# Patient Record
Sex: FEMALE | Race: OTHER | Employment: FULL TIME | URBAN - METROPOLITAN AREA
[De-identification: names, ages, dates, MRNs, and addresses within clinical notes are randomized per-mention and may not be internally consistent; named-entity substitution may affect disease eponyms.]

---

## 2017-01-06 ENCOUNTER — OFFICE VISIT (OUTPATIENT)
Dept: FAMILY MEDICINE CLINIC | Age: 37
End: 2017-01-06

## 2017-01-06 VITALS
HEART RATE: 78 BPM | SYSTOLIC BLOOD PRESSURE: 132 MMHG | TEMPERATURE: 98.1 F | BODY MASS INDEX: 21.67 KG/M2 | HEIGHT: 68 IN | DIASTOLIC BLOOD PRESSURE: 83 MMHG | WEIGHT: 143 LBS

## 2017-01-06 DIAGNOSIS — L42 PITYRIASIS ROSEA: Primary | ICD-10-CM

## 2017-01-06 RX ORDER — PREDNISONE 10 MG/1
TABLET ORAL
Qty: 21 TAB | Refills: 0 | Status: SHIPPED | OUTPATIENT
Start: 2017-01-06 | End: 2021-09-02

## 2017-01-06 RX ORDER — TRIAMCINOLONE ACETONIDE 1 MG/G
CREAM TOPICAL 2 TIMES DAILY
Qty: 60 G | Refills: 1 | Status: SHIPPED | OUTPATIENT
Start: 2017-01-06 | End: 2021-09-02

## 2017-01-06 RX ORDER — LORATADINE 10 MG/1
10 TABLET ORAL DAILY
Qty: 30 TAB | Refills: 2 | Status: SHIPPED | OUTPATIENT
Start: 2017-01-06 | End: 2021-09-02

## 2017-01-06 RX ORDER — DIPHENHYDRAMINE HCL 25 MG
50 CAPSULE ORAL
Qty: 30 CAP | Refills: 1 | Status: SHIPPED | OUTPATIENT
Start: 2017-01-06 | End: 2021-09-02

## 2017-01-06 NOTE — PROGRESS NOTES
Avs discussed with Chelsea 21 by Discharge Nurse Mary Longo LPN with  Jaguar Rosales. Discussed medication prescribed today, price info given to pt form good rx. Family planning info given to pt. Pt verbalized understanding and has no further questions.  AVS printed and given to patient Mary Longo LPN

## 2017-01-06 NOTE — PATIENT INSTRUCTIONS
Pitiriasis rosada: Instrucciones de cuidado - [ Pityriasis Rosea: Care Instructions ]  Instrucciones de cuidado  La pitiriasis rosada es un salpullido común de la piel. Suele empezar rick lissa nita escamosa de color entre rojizo y rosáceo en el abdomen o la espalda. Días o semanas después, aparecen más manchas. El salpullido puede causar comezón, malcolm no se transmitirá a otras personas. No se conocen las causas de la pitiriasis rosada. Algunos médicos creen que es lissa reacción a un virus. La pitiriasis rosada es más común en los niños y [place]. Dura de 1 a 3 meses y luego desaparece por sí ren. El uso de un medicamento puede ayudarle a aliviar la comezón. La atención de seguimiento es lissa parte clave de kate tratamiento y seguridad. Asegúrese de hacer y acudir a todas las citas, y llame a kate médico si está teniendo problemas. También es lissa buena idea saber los resultados de los exámenes y mantener lissa lista de los medicamentos que lalita. ¿Cómo puede cuidarse en el hogar? · Use halie medicamentos exactamente rick le fueron recetados. Llame a kate médico si tiene algún problema con los medicamentos. · Exponga la piel a pequeñas cantidades de tiffani solar, malcolm evite las quemaduras de sol. La tiffani solar puede reducir el salpullido. · Utilice un [facility], rick [place] o [place], cuando se lave la piel. · Añada un puñado de josé antonio (molida en forma de polvo) al agua del baño. O puede probar un producto para el baño a base de josé antonio, rick [place]. Mantenga el agua tibia o templada. Un baño o lissa ducha con [place] puede hacer que el salpullido sea más visible y que le cause más comezón. · Use lissa crema con hidrocortisona al 1% de venta dale para las pequeñas zonas que causan comezón. ¿Cuándo debe pedir ayuda? Llame a kate médico ahora mismo o busque atención médica inmediata si:  · Tiene comezón en todo el cuerpo, malcolm no hay salpullido ni otra causa evidente.   · La comezón es tan intensa que no puede win. · El tratamiento en el hogar no le [facility]. · Kate piel está muy agrietada por rascarse. · Tiene signos de infección, tales rick:  ¨ Dolor, calor o hinchazón cerca del salpullido. ¨ Vetas rojizas cerca del salpullido. ¨ Pus que supura del salpullido. Klaus Gavin. · Ve el salpullido en [address] plantas de los pies. Preste especial atención a los cambios en kate tori y asegúrese de comunicarse con kate médico si:  · No mejora rick se esperaba. ¿Dónde puede encontrar más información en inglés? Garrett Ly a http://armin-raisa.info/. Escriba S327 en la búsqueda para aprender más acerca de \"Pitiriasis rosada: Instrucciones de cuidado - [ Pityriasis Rosea: Care Instructions ]. \"  Revisado: 5 febrero, 2016  Versión del contenido: 11.1  © 1737-6859 Healthwise, Incorporated. Las instrucciones de cuidado fueron adaptadas bajo licencia por [employer] (which disclaims liability or warranty for this information). Si usted tiene [place] afección médica o sobre estas instrucciones, siempre pregunte a kate profesional de tori. Healthwise, Incorporated niega toda garantía o responsabilidad por kate uso de esta información.

## 2017-01-06 NOTE — MR AVS SNAPSHOT
Visit Information Dima Ayoub Personal Médico Departamento Teléfono del Dep. Número de visita 1/6/2017 10:00 AM [facility] Laqueta Holiday [phone] Follow-up Instructions Return if symptoms worsen or fail to improve. Upcoming Health Maintenance Date Due DTaP/Tdap/Td series (1 - Tdap) 12/5/2002 PAP AKA CERVICAL CYTOLOGY 12/5/2002 INFLUENZA AGE 9 TO ADULT 8/1/2016 Alergias  Review Complete El: 1/6/2017 Por: Sharrell Lombard A partir del:  1/6/2017 No Known Allergies Vacunas actuales Sherren Bertram No hay ninguna vacuna archivada. No revisadas esta visita You Were Diagnosed With   
  
 Marly Stammer Pityriasis rosea    -  Primary ICD-10-CM: L42 
ICD-9-CM: 696.3 Partes vitales PS Pulso Temperatura [place] ( percentil de crecimiento) Peso (percentil de crecimiento) LMP (última lucy) 132/83 (BP 1 Location: Left arm, BP Patient Position: Sitting) 78 98.1 °F (36.7 °C) (Oral) 5' 7.68\" (1.719 m) 143 lb (64.9 kg) 12/15/2016 BMI [facility]) Estado obstétrico Estatus de tabaquísmo 21.95 kg/m2 Having regular periods Never Smoker Historial de signos vitales BMI and BSA Data Body Mass Index Body Surface Area  
 21.95 kg/m 2 1.76 m 2 [facility]acy Name Phone [facility] [phone] [facility] [phone] Weinstein lista de medicamentos actualizada Lista actualizada el: 1/6/17 11:08 AM.  Jose Juan Leonard use weinstein lista de medicamentos más reciente. diphenhydrAMINE 25 mg capsule También conocido rick:  BENADRYL Take 2 Caps by mouth every six (6) hours as needed. [place] 2 pastilla cada noche si necissita por weinstein piel  
  
 loratadine 10 mg tablet También conocido rick:  Philipp Ureña Take 1 Tab by mouth daily.  Lund 1 cada manana  
  
 triamcinolone acetonide 0.1 % topical cream  
 También conocido rick:  KENALOG Apply  to affected area two (2) times a day. use thin layer Recetas Enviado a la [place] Refills  
 triamcinolone acetonide (KENALOG) 0.1 % topical cream 1 Sig: Apply  to affected area two (2) times a day. use thin layer Class: Normal  
 Pharmacy: [address] Ph #: [phone] Route: Topical  
 loratadine (CLARITIN) 10 mg tablet 2 Sig: Take 1 Tab by mouth daily. [place] 1 cada [employer] Class: Normal  
 Pharmacy: [address] Ph #: [phone] Route: Oral  
 diphenhydrAMINE (BENADRYL) 25 mg capsule 1 Sig: Take 2 Caps by mouth every six (6) hours as needed. [place] 2 pastilla cada noche si necissita por kate piel Class: Normal  
 Pharmacy: [address] Ph #: [phone] Route: Oral  
  
Instrucciones de seguimiento Return if symptoms worsen or fail to improve. Instrucciones para el Paciente Pitiriasis rosada: Instrucciones de cuidado - [ Pityriasis Rosea: Care Instructions ] Instrucciones de cuidado La pitiriasis rosada es un salpullido común de la piel. Suele empezar rick lissa nita escamosa de color entre rojizo y rosáceo en el abdomen o la espalda. Días o semanas después, aparecen más manchas. El salpullido puede causar comezón, malcolm no se transmitirá a otras personas. No se conocen las causas de la pitiriasis rosada. Algunos médicos creen que es lissa reacción a un virus. La pitiriasis rosada es más común en los niños y [place]. Dura de 1 a 3 meses y luego desaparece por sí ren. El uso de un medicamento puede ayudarle a aliviar la comezón. La atención de seguimiento es lissa parte clave de kate tratamiento y seguridad. Asegúrese de hacer y acudir a todas las citas, y llame a kate médico si está teniendo problemas.  También es lissa buena idea [employer] resultados de los exámenes y mantener lissa lista de los medicamentos que lalita. Cómo puede cuidarse en el [facility]? · Use halie medicamentos exactamente rick le fueron recetados. Llame a kate médico si tiene algún problema con los medicamentos. · Exponga la piel a pequeñas cantidades de tiffani solar, malcolm evite las quemaduras de sol. La tiffani solar puede reducir el salpullido. · Utilice un [facility], rick [place] o [place], cuando se lave la piel. · Añada un puñado de josé antonio (molida en forma de polvo) al agua del baño. O puede probar un producto para el baño a base de josé antonio, rick [place]. Mantenga el agua tibia o templada. Un baño o lissa ducha con [place] puede hacer que el salpullido sea más visible y que le cause más comezón. · Use lissa crema con hidrocortisona al 1% de venta dale para las pequeñas zonas que causan comezón. Cuándo debe pedir ayuda? Llame a kate médico ahora mismo o busque atención médica inmediata si: · Tiene comezón en todo el cuerpo, malcolm no hay salpullido ni otra causa evidente. · La comezón es tan intensa que no puede dormir. · El tratamiento en el hogar no le [facility]. · Kate piel está muy agrietada por rascarse. · Tiene signos de infección, tales rick: ¨ Dolor, calor o hinchazón cerca del salpullido. ¨ Vetas rojizas cerca del salpullido. ¨ Pus que supura del salpullido. Argie Cleaves. · Ve el salpullido en [address] plantas de los pies. Preste especial atención a los cambios en kate tori y asegúrese de comunicarse con kate médico si: 
· No mejora rick se esperaba. Dónde puede encontrar más información en inglés? Gisela Glee a http://armin-raisa.info/. Escriba S327 en la búsqueda para aprender más acerca de \"Pitiriasis rosada: Instrucciones de cuidado - [ Pityriasis Rosea: Care Instructions ]. \" 
Revisado: 5 febrero, 2016 Versión del contenido: 11.1 © 4885-3921 [employer], Incorporated.  Las instrucciones de cuidado fueron adaptadas bajo licencia por [employer] (which disclaims liability or warranty for this information). Si usted tiene [place] afección médica o sobre estas instrucciones, siempre pregunte a kate profesional de tori. [facility]orporated niega toda garantía o responsabilidad por kate uso de esta información. Introducing [facility]! [facility] introduce portal paciente MyChart . Ahora se puede acceder a partes de kate expediente médico, enviar por correo electrónico la oficina de kate médico y solicitar renovaciones de medicamentos en línea. En kate navegador de Internet , Ras Colon a [email or web address] Sadiq clic en el usuario por Arturo Beat? Vonne Alexandro clic aquí en la sesión [place]. Verá la página de registro [place]. Ingrese kate código de [employer] sera y rick aparece a continuación. Usted no tendrá que [employer] código después de michael completado el proceso de registro . Si usted no se inscribe antes de la fecha de caducidad , debe solicitar un nuevo código. · MyChart Código de acceso : [id number] Expires: 4/6/2017 11:08 AM 
 
Ingresa los últimos cuatro dígitos de kate Número de Seguro Social ( xxxx ) y fecha de nacimiento ( dd / mm / aaaa ) rick se indica y sadiq clic en Enviar. ted será llevado a la siguiente página de registro . Crear un ID MyChart . Esta será kate ID de inicio de sesión de MyChart y no puede ser [place] , por lo que pensar en lissa que es Harry Amadou y fácil de recordar . Crear lissa contraseña MyChart . ted puede cambiar kate contraseña en cualquier momento . Ingrese kate Password Reset de preguntas y [place] . [place] se puede utilizar en un momento posterior si usted olvida kate contraseña. Introduzca kate dirección de correo electrónico . Dajuan Quesada recibirá lissa notificación por correo electrónico cuando la nueva información está disponible en MyChart . Darci Pillar clic en Registrarse.  Di Mings nate y descargar porciones de kate expediente Elena Heal clic en el enlace de descarga del menú Resumen para descargar lissa copia portátil de kate información médica . Si tiene [employer] , por favor visite la sección de preguntas frecuentes del sitio web MyChart . Recuerde, MyChart NO es que se utilizará para las necesidades urgentes. Para emergencias médicas , llame al 911 . Ahora disponible en kate iPhone y Android ! Por favor proporcione apolinar resumen de la documentación de cuidado a kate próximo proveedor. If you have any questions after today's visit, please call [phone].

## 2017-01-06 NOTE — PROGRESS NOTES
Assessment/Plan:    Randolph was seen today for rash. Diagnoses and all orders for this visit:    Pityriasis rosea  -     triamcinolone acetonide (KENALOG) 0.1 % topical cream; Apply  to affected area two (2) times a day. use thin layer  -     loratadine (CLARITIN) 10 mg tablet; Take 1 Tab by mouth daily. [place] 1 cada manana  -     diphenhydrAMINE (BENADRYL) 25 mg capsule; Take 2 Caps by mouth every six (6) hours as needed. [place] 2 pastilla cada noche si necissita por kate piel        Follow-up Disposition:  Return if symptoms worsen or fail to improve. Derrick Bingham PA-C  Randolph Jamal Crews expressed understanding of this plan. An AVS was printed and given to the patient.      ----------------------------------------------------------------------    Chief Complaint   Patient presents with    Rash     pt c/o itchy rash all over body x1 week       History of Present Illness:  Rash first noticed one lesion under her breast > 1 week ago then broke out in the current rash about 1 week ago. The rash is very pruritic in nature. She has not been sick recently. She has no sick family members. She has been taking OCP for a long time, no other meds and no new meds. She has not had Upper respiratory sxs nor a fever. She is eating well. She is uncomfortable due to the itchy nature of this rash. No past medical history on file. Current Outpatient Prescriptions   Medication Sig Dispense Refill    triamcinolone acetonide (KENALOG) 0.1 % topical cream Apply  to affected area two (2) times a day. use thin layer 60 g 1    loratadine (CLARITIN) 10 mg tablet Take 1 Tab by mouth daily. [place] 1 cada manana 30 Tab 2    diphenhydrAMINE (BENADRYL) 25 mg capsule Take 2 Caps by mouth every six (6) hours as needed.  [place] 2 pastilla cada noche si necissita por kate piel 30 Cap 1       No Known Allergies    Social History   Substance Use Topics    Smoking status: Never Smoker    Smokeless tobacco: None    Alcohol use No       No family history on file. Physical Exam:     Visit Vitals    /83 (BP 1 Location: Left arm, BP Patient Position: Sitting)    Pulse 78    Temp 98.1 °F (36.7 °C) (Oral)    Ht 5' 7.68\" (1.719 m)    Wt 143 lb (64.9 kg)    LMP 12/15/2016    BMI 21.95 kg/m2       A&Ox3  WDWN NAD  Respirations normal and non labored  Pt has one large lesion under her left breast that she states was the first lesion she noticed- it is about 2 times the size of the other lesions. The lesions are loosely arranged in the typical Trina tree pattern. She has lesions on her arms, chest, abdomen and back. They spare her face and hands. The lesions are characterized by being pinkish, salmon colored small plaques with oval configuration. They are scaly in appearance with a collarette scale.  Most of the lesions are between dime and nickel sized except for the herald patch that is twice this size

## 2017-05-20 ENCOUNTER — HOSPITAL ENCOUNTER (OUTPATIENT)
Dept: LAB | Age: 37
Discharge: HOME | End: 2017-05-20

## 2017-05-20 LAB
ALBUMIN SERPL BCP-MCNC: 3.9 G/DL (ref 3.5–5)
ALBUMIN/GLOB SERPL: 1.1 {RATIO} (ref 1.1–2.2)
ALP SERPL-CCNC: 137 U/L (ref 45–117)
ALT SERPL-CCNC: 27 U/L (ref 12–78)
ANION GAP BLD CALC-SCNC: 8 MMOL/L (ref 5–15)
AST SERPL W P-5'-P-CCNC: 19 U/L (ref 15–37)
BILIRUB SERPL-MCNC: 0.4 MG/DL (ref 0.2–1)
BUN SERPL-MCNC: 10 MG/DL (ref 6–20)
BUN/CREAT SERPL: 18 (ref 12–20)
CALCIUM SERPL-MCNC: 8.9 MG/DL (ref 8.5–10.1)
CHLORIDE SERPL-SCNC: 105 MMOL/L (ref 97–108)
CHOLEST SERPL-MCNC: 163 MG/DL
CO2 SERPL-SCNC: 28 MMOL/L (ref 21–32)
CREAT SERPL-MCNC: 0.56 MG/DL (ref 0.55–1.02)
GLOBULIN SER CALC-MCNC: 3.5 G/DL (ref 2–4)
GLUCOSE SERPL-MCNC: 84 MG/DL (ref 65–100)
HDLC SERPL-MCNC: 75 MG/DL
HDLC SERPL: 2.2 {RATIO} (ref 0–5)
LDLC SERPL CALC-MCNC: 81.4 MG/DL (ref 0–100)
LIPID PROFILE,FLP: NORMAL
POTASSIUM SERPL-SCNC: 4.2 MMOL/L (ref 3.5–5.1)
PROT SERPL-MCNC: 7.4 G/DL (ref 6.4–8.2)
SODIUM SERPL-SCNC: 141 MMOL/L (ref 136–145)
TRIGL SERPL-MCNC: 33 MG/DL (ref ?–150)
VLDLC SERPL CALC-MCNC: 6.6 MG/DL

## 2017-05-20 PROCEDURE — 80061 LIPID PANEL: CPT | Performed by: NURSE PRACTITIONER

## 2017-05-20 PROCEDURE — 80053 COMPREHEN METABOLIC PANEL: CPT | Performed by: NURSE PRACTITIONER

## 2017-07-13 ENCOUNTER — HOSPITAL ENCOUNTER (OUTPATIENT)
Dept: LAB | Age: 37
Discharge: HOME | End: 2017-07-13

## 2017-07-13 PROCEDURE — 88175 CYTOPATH C/V AUTO FLUID REDO: CPT | Performed by: NURSE PRACTITIONER

## 2017-07-13 PROCEDURE — 87624 HPV HI-RISK TYP POOLED RSLT: CPT | Performed by: NURSE PRACTITIONER

## 2017-12-20 ENCOUNTER — HOSPITAL ENCOUNTER (OUTPATIENT)
Dept: MAMMOGRAPHY | Age: 37
Discharge: HOME | End: 2017-12-20
Attending: INTERNAL MEDICINE
Payer: SELF-PAY

## 2017-12-20 DIAGNOSIS — N64.9 BREAST DISORDER: ICD-10-CM

## 2017-12-20 PROCEDURE — 76882 US LMTD JT/FCL EVL NVASC XTR: CPT

## 2017-12-20 PROCEDURE — 77066 DX MAMMO INCL CAD BI: CPT

## 2020-10-08 ENCOUNTER — HOSPITAL ENCOUNTER (OUTPATIENT)
Dept: LAB | Age: 40
Discharge: HOME | End: 2020-10-08

## 2020-10-08 LAB
ALBUMIN SERPL-MCNC: 3.7 G/DL (ref 3.5–5)
ALBUMIN/GLOB SERPL: 0.9 {RATIO} (ref 1.1–2.2)
ALP SERPL-CCNC: 116 U/L (ref 45–117)
ALT SERPL-CCNC: 17 U/L (ref 12–78)
ANION GAP SERPL CALC-SCNC: 5 MMOL/L (ref 5–15)
AST SERPL-CCNC: 19 U/L (ref 15–37)
BASOPHILS # BLD: 0.1 K/UL (ref 0–0.1)
BASOPHILS NFR BLD: 2 % (ref 0–1)
BILIRUB SERPL-MCNC: 0.4 MG/DL (ref 0.2–1)
BUN SERPL-MCNC: 10 MG/DL (ref 6–20)
BUN/CREAT SERPL: 17 (ref 12–20)
CALCIUM SERPL-MCNC: 9.3 MG/DL (ref 8.5–10.1)
CHLORIDE SERPL-SCNC: 106 MMOL/L (ref 97–108)
CHOLEST SERPL-MCNC: 181 MG/DL
CO2 SERPL-SCNC: 27 MMOL/L (ref 21–32)
CREAT SERPL-MCNC: 0.6 MG/DL (ref 0.55–1.02)
DIFFERENTIAL METHOD BLD: ABNORMAL
EOSINOPHIL # BLD: 0.1 K/UL (ref 0–0.4)
EOSINOPHIL NFR BLD: 1 % (ref 0–7)
ERYTHROCYTE [DISTWIDTH] IN BLOOD BY AUTOMATED COUNT: 16.9 % (ref 11.5–14.5)
GLOBULIN SER CALC-MCNC: 3.9 G/DL (ref 2–4)
GLUCOSE SERPL-MCNC: 78 MG/DL (ref 65–100)
HCT VFR BLD AUTO: 38.1 % (ref 35–47)
HDLC SERPL-MCNC: 68 MG/DL
HDLC SERPL: 2.7 {RATIO} (ref 0–5)
HGB BLD-MCNC: 11.1 G/DL (ref 11.5–16)
IMM GRANULOCYTES # BLD AUTO: 0 K/UL (ref 0–0.04)
IMM GRANULOCYTES NFR BLD AUTO: 0 % (ref 0–0.5)
LDLC SERPL CALC-MCNC: 100.6 MG/DL (ref 0–100)
LIPID PROFILE,FLP: ABNORMAL
LYMPHOCYTES # BLD: 1.3 K/UL (ref 0.8–3.5)
LYMPHOCYTES NFR BLD: 21 % (ref 12–49)
MCH RBC QN AUTO: 22.2 PG (ref 26–34)
MCHC RBC AUTO-ENTMCNC: 29.1 G/DL (ref 30–36.5)
MCV RBC AUTO: 76.2 FL (ref 80–99)
MONOCYTES # BLD: 0.4 K/UL (ref 0–1)
MONOCYTES NFR BLD: 6 % (ref 5–13)
NEUTS SEG # BLD: 4.3 K/UL (ref 1.8–8)
NEUTS SEG NFR BLD: 70 % (ref 32–75)
NRBC # BLD: 0 K/UL (ref 0–0.01)
NRBC BLD-RTO: 0 PER 100 WBC
PLATELET # BLD AUTO: 309 K/UL (ref 150–400)
PMV BLD AUTO: 12.3 FL (ref 8.9–12.9)
POTASSIUM SERPL-SCNC: 4.4 MMOL/L (ref 3.5–5.1)
PROT SERPL-MCNC: 7.6 G/DL (ref 6.4–8.2)
RBC # BLD AUTO: 5 M/UL (ref 3.8–5.2)
SODIUM SERPL-SCNC: 138 MMOL/L (ref 136–145)
TRIGL SERPL-MCNC: 62 MG/DL (ref ?–150)
VLDLC SERPL CALC-MCNC: 12.4 MG/DL
WBC # BLD AUTO: 6.1 K/UL (ref 3.6–11)

## 2020-10-08 PROCEDURE — 80053 COMPREHEN METABOLIC PANEL: CPT

## 2020-10-08 PROCEDURE — 85025 COMPLETE CBC W/AUTO DIFF WBC: CPT

## 2020-10-08 PROCEDURE — 80061 LIPID PANEL: CPT

## 2021-04-28 ENCOUNTER — TRANSCRIBE ORDER (OUTPATIENT)
Dept: SCHEDULING | Age: 41
End: 2021-04-28

## 2021-04-28 DIAGNOSIS — R19.03 ABDOMINAL SWELLING, RIGHT LOWER QUADRANT: Primary | ICD-10-CM

## 2021-05-04 ENCOUNTER — HOSPITAL ENCOUNTER (OUTPATIENT)
Dept: ULTRASOUND IMAGING | Age: 41
Discharge: HOME | End: 2021-05-04
Attending: INTERNAL MEDICINE
Payer: SUBSIDIZED

## 2021-05-04 DIAGNOSIS — R19.03 ABDOMINAL SWELLING, RIGHT LOWER QUADRANT: ICD-10-CM

## 2021-05-04 PROCEDURE — 76856 US EXAM PELVIC COMPLETE: CPT

## 2021-05-04 PROCEDURE — 76830 TRANSVAGINAL US NON-OB: CPT

## 2021-05-26 ENCOUNTER — OFFICE VISIT (OUTPATIENT)
Dept: GYNECOLOGY | Age: 41
End: 2021-05-26

## 2021-05-26 VITALS
DIASTOLIC BLOOD PRESSURE: 75 MMHG | HEIGHT: 67 IN | HEART RATE: 72 BPM | BODY MASS INDEX: 23.07 KG/M2 | SYSTOLIC BLOOD PRESSURE: 117 MMHG | WEIGHT: 147 LBS

## 2021-05-26 DIAGNOSIS — N85.8 UTERINE MASS: Primary | ICD-10-CM

## 2021-05-26 PROCEDURE — 99204 OFFICE O/P NEW MOD 45 MIN: CPT | Performed by: OBSTETRICS & GYNECOLOGY

## 2021-05-26 RX ORDER — NORGESTIMATE AND ETHINYL ESTRADIOL 0.25-0.035
KIT ORAL
COMMUNITY
Start: 2021-05-18

## 2021-05-26 NOTE — PROGRESS NOTES
New Patient, Referred by Dr. Kathleen Sanchez now for uterine mass    1. Have you been to the ER, urgent care clinic since your last visit? Hospitalized since your last visit?  no    2. Have you seen or consulted any other health care providers outside of the [address] [place] [address] since your last visit? Include any pap smears or colon screening.    Yes

## 2021-06-01 ENCOUNTER — HOSPITAL ENCOUNTER (OUTPATIENT)
Dept: MRI IMAGING | Age: 41
Discharge: HOME | End: 2021-06-01
Attending: OBSTETRICS & GYNECOLOGY
Payer: SUBSIDIZED

## 2021-06-01 VITALS — BODY MASS INDEX: 22.87 KG/M2 | WEIGHT: 146 LBS

## 2021-06-01 DIAGNOSIS — N85.8 UTERINE MASS: ICD-10-CM

## 2021-06-01 PROCEDURE — 74011250636 HC RX REV CODE- 250/636: Performed by: OBSTETRICS & GYNECOLOGY

## 2021-06-01 PROCEDURE — 72197 MRI PELVIS W/O & W/DYE: CPT

## 2021-06-01 PROCEDURE — A9575 INJ GADOTERATE MEGLUMI 0.1ML: HCPCS | Performed by: OBSTETRICS & GYNECOLOGY

## 2021-06-01 RX ORDER — GADOTERATE MEGLUMINE 376.9 MG/ML
13 INJECTION INTRAVENOUS
Status: COMPLETED | OUTPATIENT
Start: 2021-06-01 | End: 2021-06-01

## 2021-06-01 RX ADMIN — GADOTERATE MEGLUMINE 13 ML: 376.9 INJECTION INTRAVENOUS at 15:03

## 2021-06-02 PROBLEM — N85.8 UTERINE MASS: Status: ACTIVE | Noted: 2021-06-02

## 2021-06-02 NOTE — PROGRESS NOTES
[facility] [address], [address]  P [phone]  F [phone]    Office Note  Patient ID:  Name:  Es Lamar  MRN:  [id number]  :  1980/40 y.o. Date:  2021      HISTORY OF PRESENT ILLNESS:  Ms. Es Lamar is a 45 y.o.  premenopausal female who presents as a new patient from [facility] for concerns of a possible uterine mass. Patient reports RLQ severe stabbing pain in 2021. Denies nausea or vomiting. Denies change in appetite or bowel habits. Denies change in menses. Denies fevers or chills. Denies constipation or diarrhea. Reports that the pain has completely resolved now. She reports the pain started when she came off of her OCPs. Now that she has restarted them her pain has resolved. Denies CP or SOB. Negative UPT at that time. Pelvic ultrasound on 2021 demonstrated \"uterien mass, with carcinoma not excluded\". She was then subsequently referred to our office for further discussion and management. Pertinent PMH/PSH: n/a      Active, no restrictions. Imaging Review:   Pelvic ultrasound 2021:  \"A midline smooth oval solid uterine mass with internal vascularity measures 2.6 x 2.5 x 2.0cm and fills and distends the fundal portion of th endometrial cavity. Differential considerations include endometrial carcinoma, endometrial polyp, submucosal fibroid. \"    ROS:  A comprehensive review of systems was negative except for that written in the History of Present Illness. , 10 point ROS    OB/GYN ROS:  Patient denies significant menstrual problems.     ECOG stGstrstastdstest:st st1st Problem List:  Patient Active Problem List    Diagnosis Date Noted    Uterine mass 2021     PMH:  Past Medical History:   Diagnosis Date    Abnormal Papanicolaou smear of cervix 2018    ASCUS    Rosacea       PSH:  Past Surgical History:   Procedure Laterality Date    HX COLPOSCOPY  2020    HX LEEP PROCEDURE JONH 3      Social History:  Social History     Tobacco Use    Smoking status: Never Smoker    Smokeless tobacco: Never Used   Substance Use Topics    Alcohol use: No      Family History:  Family History   Problem Relation Age of Onset    Hypertension Mother     Diabetes Maternal Grandmother     Cancer Maternal Grandfather         colon cancer      Medications: (reviewed)  Current Outpatient Medications   Medication Sig    Sprintec, 28, 0.25-35 mg-mcg tab TAKE 1 TABLET BY MOUTH ONCE DAILY    loratadine (CLARITIN) 10 mg tablet Take 1 Tab by mouth daily. Rodriguez Hevia 1 cada debby    triamcinolone acetonide (KENALOG) 0.1 % topical cream Apply  to affected area two (2) times a day. use thin layer (Patient not taking: Reported on 5/26/2021)    diphenhydrAMINE (BENADRYL) 25 mg capsule Take 2 Caps by mouth every six (6) hours as needed. Rodriguez Hevia 2 pastilla cada noche si necissita por kate piel (Patient not taking: Reported on 5/26/2021)    predniSONE (DELTASONE) 10 mg tablet Take 6 today then 1 less pill daily until all pills are gone (Patient not taking: Reported on 5/26/2021)     No current facility-administered medications for this visit. Allergies: (reviewed)  No Known Allergies     Gyn History:   Last pap: ASC-H  2017 being followed up by Dr. Nia Rose per patient  History of abnormal pap: yes, h/o JONH-3 s/p prior LEEP  Menses: regular  Contraception: OCPs     OBJECTIVE:    Physical Exam:  VITAL SIGNS: Vitals:    05/26/21 1439   BP: 117/75   Pulse: 72   Weight: 147 lb (66.7 kg)   Height: 5' 7\" (1.702 m)     Body mass index is 23.02 kg/m². GENERAL NIRAJ: Conversant, alert, oriented. No acute distress. HEENT: HEENT. No thyroid enlargement. No JVD. Neck: Supple without restrictions. RESPIRATORY: Clear to auscultation and percussion to the bases. No CVAT. CARDIOVASC: RRR without murmur/rub.    GASTROINT: soft, non-tender, without masses or organomegaly   MUSCULOSKEL: no joint tenderness, deformity or swelling       EXTREMITIES: extremities normal, atraumatic, no cyanosis or edema   PELVIC: Exam chaperoned by nurse. Normal appearing external genitalia. On speculum exam, normal appearing vagina and cervix. On bimanual exam, the cervix and uterus are normal size and mobile. There is a small 2-3cm anterior fibroid. No evidence of adnexal masses or nodularity. RECTAL: deferred   DEVI SURVEY: No suspicious lymphadenopathy or edema noted. NEURO: Grossly intact. No acute deficit. Lab Date as available:    Lab Results   Component Value Date/Time    WBC 6.1 10/08/2020 09:09 AM    HGB 11.1 (L) 10/08/2020 09:09 AM    HCT 38.1 10/08/2020 09:09 AM    PLATELET 535 54/53/2021 09:09 AM    MCV 76.2 (L) 10/08/2020 09:09 AM     Lab Results   Component Value Date/Time    Sodium 138 10/08/2020 09:09 AM    Potassium 4.4 10/08/2020 09:09 AM    Chloride 106 10/08/2020 09:09 AM    CO2 27 10/08/2020 09:09 AM    Anion gap 5 10/08/2020 09:09 AM    Glucose 78 10/08/2020 09:09 AM    BUN 10 10/08/2020 09:09 AM    Creatinine 0.60 10/08/2020 09:09 AM    BUN/Creatinine ratio 17 10/08/2020 09:09 AM    GFR est AA >60 10/08/2020 09:09 AM    GFR est non-AA >60 10/08/2020 09:09 AM    Calcium 9.3 10/08/2020 09:09 AM         IMPRESSION/PLAN:    Ms. Tri Soria is a 45 y.o. female with a working diagnosis of possible uterine mass    Problems:     Patient Active Problem List    Diagnosis Date Noted    Uterine mass 06/02/2021       I reviewed Ms. Randolph Crews's course to date, including her medical records, recent studies, physical exam, and review of symptoms. Fortunately the patient's symptomatology has resolved. Her exam is normal. I have recommended a pelvic MRI for further evaluation of her uterine mass as this most likely is a fibroid. Reassured patient. Having said that, I do believe an MRI is warranted to rule out any other malignant process.  The patient will return via virtual visit to discuss the results of the MRI and any possible further imaging. Per the patient she is following up with Dr. Addie Fox regarding her ASC-H pap smear in 2017. I will confirm this again with the patient when she returns, as this certainly needs attention if she is not following up. Today's visit performed with an .      Defined Sensitive Document    >50% of total time allocated to visit dedicated to counseling, 50 minutes total.    Signed By: Jasen Perez MD     6/2/2021/9:09 AM

## 2021-06-16 ENCOUNTER — OFFICE VISIT (OUTPATIENT)
Dept: GYNECOLOGY | Age: 41
End: 2021-06-16
Payer: SUBSIDIZED

## 2021-06-16 VITALS
HEART RATE: 76 BPM | DIASTOLIC BLOOD PRESSURE: 80 MMHG | BODY MASS INDEX: 22.91 KG/M2 | WEIGHT: 146 LBS | SYSTOLIC BLOOD PRESSURE: 123 MMHG | HEIGHT: 67 IN

## 2021-06-16 DIAGNOSIS — N85.8 UTERINE MASS: Primary | ICD-10-CM

## 2021-06-16 DIAGNOSIS — N75.0 BARTHOLIN'S CYST: ICD-10-CM

## 2021-06-16 PROCEDURE — 99213 OFFICE O/P EST LOW 20 MIN: CPT | Performed by: OBSTETRICS & GYNECOLOGY

## 2021-06-16 NOTE — PROGRESS NOTES
MRI results    1. Have you been to the ER, urgent care clinic since your last visit? Hospitalized since your last visit?  no    2. Have you seen or consulted any other health care providers outside of the [address] [place] [address] since your last visit? Include any pap smears or colon screening.    no

## 2021-07-12 NOTE — PROGRESS NOTES
One month follow up for bartholin's cyst ,  pt reports no abnormal spotting or bleeding, pt states she has no questions or concerns for today's visit    1. Have you been to the ER, urgent care clinic since your last visit? Hospitalized since your last visit?  no    2. Have you seen or consulted any other health care providers outside of the [address] [place] [address] since your last visit? Include any pap smears or colon screening.    no

## 2021-07-13 ENCOUNTER — OFFICE VISIT (OUTPATIENT)
Dept: GYNECOLOGY | Age: 41
End: 2021-07-13
Payer: SUBSIDIZED

## 2021-07-13 VITALS
HEART RATE: 76 BPM | DIASTOLIC BLOOD PRESSURE: 85 MMHG | SYSTOLIC BLOOD PRESSURE: 125 MMHG | BODY MASS INDEX: 23.48 KG/M2 | HEIGHT: 67 IN | WEIGHT: 149.6 LBS

## 2021-07-13 DIAGNOSIS — N75.0 BARTHOLIN'S CYST: Primary | ICD-10-CM

## 2021-07-13 DIAGNOSIS — N85.8 UTERINE MASS: ICD-10-CM

## 2021-07-13 PROCEDURE — 99214 OFFICE O/P EST MOD 30 MIN: CPT | Performed by: OBSTETRICS & GYNECOLOGY

## 2021-07-13 NOTE — PROGRESS NOTES
[address] [facility] [address], [address] [facility], [place], operated by [facility] [phone]  F [phone]    Office Note  Patient ID:  Name:  Dereje Clifton  MRN:  [id number]  :  1980/40 y.o. Date:  2021      HISTORY OF PRESENT ILLNESS:  Ms. Dereje Clifton is a 45 y.o. female who initially presented from [facility] for concerns of a possible uterine mass. MRI pelvis 2021:  \"1. Normal sized uterus contains multiple fibroids. Largest fibroid measures 2.6  cm and is submucosal in the fundus. This finding corresponds to the ultrasound  finding. No imaging evidence of endometrial carcinoma. If the patient has  abnormal uterine bleeding, consider biopsy to prove fibroid rather than  carcinoma. 2. 5.7 cm Bartholin's gland cyst to the right of midline may be palpable in the  labia majora. 1.1 cm left Bartholin's gland cyst.  3. Normal ovaries. \"    Presents back today to view Bartholin's cyst, to see if it is still present. She denies any symptoms. Initial History:  Ms. Dereje Clifton is a 45 y.o.  premenopausal female who presents as a new patient from [facility] for concerns of a possible uterine mass. Patient reports RLQ severe stabbing pain in 2021. Denies nausea or vomiting. Denies change in appetite or bowel habits. Denies change in menses. Denies fevers or chills. Denies constipation or diarrhea. Reports that the pain has completely resolved now. She reports the pain started when she came off of her OCPs. Now that she has restarted them her pain has resolved. Denies CP or SOB. Negative UPT at that time. Pelvic ultrasound on 2021 demonstrated \"uterien mass, with carcinoma not excluded\". She was then subsequently referred to our office for further discussion and management. Pertinent PMH/PSH: n/a      Active, no restrictions. Imaging Review:   MRI pelvis 2021:  FINDINGS: The uterus measures 9.3 x 5.6 x 4.1 cm. Hypointense T2 enhancing  submucosal fibroid at the fundus measures 2.6 x 2.6 x 2.4 cm. Intramural  posterior fundal fibroid measures 1.7 cm. Other fibroids are smaller. The endometrial stripe measures 1.7 cm. The junctional zone measures 0.5 cm. The  cervix is within normal limits. Hyperintense T2 nonenhancing cyst at the base of the distal vagina to the right  of midline extends into the right labia majora and measures 5.7 x 2.9 x 2.3 cm. A cyst at the left base of the vagina measures 1.1 cm in craniocaudal dimension. Both of these cysts are inferior to the pubic symphysis. No solidly enhancing  vaginal mass. The right ovary measures 2.7 x 2.3 x 2.2 cm. The left ovary measures 3.4 x 1.5 x  2.5 cm. No solid adnexal mass. Rectum and anus are within normal limits. Urinary bladder is within normal  limits. There is no free fluid. Bones are within normal limits. IMPRESSION  1. Normal sized uterus contains multiple fibroids. Largest fibroid measures 2.6  cm and is submucosal in the fundus. This finding corresponds to the ultrasound  finding. No imaging evidence of endometrial carcinoma. If the patient has  abnormal uterine bleeding, consider biopsy to prove fibroid rather than  carcinoma. 2. 5.7 cm Bartholin's gland cyst to the right of midline may be palpable in the  labia majora. 1.1 cm left Bartholin's gland cyst.  3. Normal ovaries. Pelvic ultrasound 5/4/2021:  \"A midline smooth oval solid uterine mass with internal vascularity measures 2.6 x 2.5 x 2.0cm and fills and distends the fundal portion of th endometrial cavity. Differential considerations include endometrial carcinoma, endometrial polyp, submucosal fibroid. \"    ROS:  A comprehensive review of systems was negative except for that written in the History of Present Illness. , 10 point ROS    OB/GYN ROS:  Patient denies significant menstrual problems.     ECOG stGstrstastdstest:st st1st Problem List:  Patient Active Problem List    Diagnosis Date Noted    Bartholin's cyst 06/16/2021    Uterine mass 06/02/2021     PMH:  Past Medical History:   Diagnosis Date    Abnormal Papanicolaou smear of cervix 04/2018    ASCUS    Rosacea       PSH:  Past Surgical History:   Procedure Laterality Date    HX COLPOSCOPY  06/2020    HX LEEP PROCEDURE      JONH 3      Social History:  Social History     Tobacco Use    Smoking status: Never Smoker    Smokeless tobacco: Never Used   Substance Use Topics    Alcohol use: No      Family History:  Family History   Problem Relation Age of Onset    Hypertension Mother     Diabetes Maternal Grandmother     Cancer Maternal Grandfather         colon cancer      Medications: (reviewed)  Current Outpatient Medications   Medication Sig    Sprintec, 28, 0.25-35 mg-mcg tab TAKE 1 TABLET BY MOUTH ONCE DAILY    triamcinolone acetonide (KENALOG) 0.1 % topical cream Apply  to affected area two (2) times a day. use thin layer    loratadine (CLARITIN) 10 mg tablet Take 1 Tab by mouth daily. Newton 1 cada manana    diphenhydrAMINE (BENADRYL) 25 mg capsule Take 2 Caps by mouth every six (6) hours as needed. Newton 2 pastilla cada noche si necissita por kate piel    predniSONE (DELTASONE) 10 mg tablet Take 6 today then 1 less pill daily until all pills are gone (Patient not taking: Reported on 7/13/2021)     No current facility-administered medications for this visit. Allergies: (reviewed)  No Known Allergies     Gyn History:   Last pap: ASC-H  2017 being followed up by Dr. Susanna Stinson per patient  History of abnormal pap: yes, h/o JONH-3 s/p prior LEEP  Menses: regular  Contraception: OCPs     OBJECTIVE:    Physical Exam:  VITAL SIGNS: Vitals:    07/13/21 0834   BP: 125/85   Pulse: 76   Weight: 149 lb 9.6 oz (67.9 kg)   Height: 5' 7\" (1.702 m)     Body mass index is 23.43 kg/m². GENERAL NIRAJ: Conversant, alert, oriented. No acute distress. HEENT: HEENT. No thyroid enlargement. No JVD. Neck: Supple without restrictions.    RESPIRATORY: Clear to auscultation and percussion to the bases. No CVAT. CARDIOVASC: RRR without murmur/rub. GASTROINT: soft, non-tender, without masses or organomegaly   MUSCULOSKEL: no joint tenderness, deformity or swelling       EXTREMITIES: extremities normal, atraumatic, no cyanosis or edema   PELVIC: Exam chaperoned by nurse. Normal appearing external genitalia. She has a 3-4cm Bartholin's cyst on the right. On speculum exam, normal appearing vagina and cervix. On bimanual exam, the cervix and uterus are normal size and mobile. There is a small 2-3cm anterior fibroid. No evidence of adnexal masses or nodularity. RECTAL: deferred   DEVI SURVEY: No suspicious lymphadenopathy or edema noted. NEURO: Grossly intact. No acute deficit. Lab Date as available:    Lab Results   Component Value Date/Time    WBC 6.1 10/08/2020 09:09 AM    HGB 11.1 (L) 10/08/2020 09:09 AM    HCT 38.1 10/08/2020 09:09 AM    PLATELET 977 90/67/8452 09:09 AM    MCV 76.2 (L) 10/08/2020 09:09 AM     Lab Results   Component Value Date/Time    Sodium 138 10/08/2020 09:09 AM    Potassium 4.4 10/08/2020 09:09 AM    Chloride 106 10/08/2020 09:09 AM    CO2 27 10/08/2020 09:09 AM    Anion gap 5 10/08/2020 09:09 AM    Glucose 78 10/08/2020 09:09 AM    BUN 10 10/08/2020 09:09 AM    Creatinine 0.60 10/08/2020 09:09 AM    BUN/Creatinine ratio 17 10/08/2020 09:09 AM    GFR est AA >60 10/08/2020 09:09 AM    GFR est non-AA >60 10/08/2020 09:09 AM    Calcium 9.3 10/08/2020 09:09 AM         IMPRESSION/PLAN:    Ms. Dereje Clifton is a 45 y.o. female with a working diagnosis of possible uterine mass. Presents back today for MRI follow-up. MRI pelvis 6/1/2021 demonstrates normal appearing ovaries and normal appearing multiple uterine fibroids, largest measuring 2.6cm. Bartholin's cyst also imaged.      Presents back today to discuss the Bartholin's cyst.     Problems:     Patient Active Problem List    Diagnosis Date Noted    Bartholin's cyst 06/16/2021    Uterine mass 06/02/2021     Reviewed patient's course to date, including her MRI results which demonstrate normal appearing fibroids and no evidence of a uterine mass. Her Bartholin's cyst seen on her initial exam and on her MRI is still present. I have recommended and EUA, possible drainage, possible marsupialization, possible Bartholin's cyst resection. Will post at the patient's convenience. Reviewed risks, benefits, indications, and alternatives of surgery. Will collect CBCD, CMP, CXR, and EKG. Per the patient she is following up with Dr. Cecy Sousa regarding her ASC-H pap smear in 2017. Today's visit performed with an . All questions and concerns were addressed with the patient and she is comfortable with the plan. An electronic signature was used to authenticate this note.      Sabra Duran MD

## 2021-07-26 ENCOUNTER — HOSPITAL ENCOUNTER (OUTPATIENT)
Dept: LAB | Age: 41
Discharge: HOME | End: 2021-07-26

## 2021-07-26 PROCEDURE — 87624 HPV HI-RISK TYP POOLED RSLT: CPT

## 2021-07-30 LAB
CYTOLOGIST CVX/VAG CYTO: NORMAL
CYTOLOGY CVX/VAG DOC THIN PREP: NORMAL
HPV APTIMA: NEGATIVE
Lab: NORMAL
PATH REPORT.FINAL DX SPEC: NORMAL
STAT OF ADQ CVX/VAG CYTO-IMP: NORMAL

## 2021-09-01 ENCOUNTER — TRANSCRIBE ORDER (OUTPATIENT)
Dept: REGISTRATION | Age: 41
End: 2021-09-01

## 2021-09-01 DIAGNOSIS — Z01.812 ENCOUNTER FOR PREOPERATIVE SCREENING LABORATORY TESTING FOR COVID-19 VIRUS: Primary | ICD-10-CM

## 2021-09-01 DIAGNOSIS — Z20.822 ENCOUNTER FOR PREOPERATIVE SCREENING LABORATORY TESTING FOR COVID-19 VIRUS: Primary | ICD-10-CM

## 2021-09-02 ENCOUNTER — HOSPITAL ENCOUNTER (OUTPATIENT)
Dept: PREADMISSION TESTING | Age: 41
Discharge: HOME | End: 2021-09-02
Payer: SUBSIDIZED

## 2021-09-02 VITALS
DIASTOLIC BLOOD PRESSURE: 79 MMHG | SYSTOLIC BLOOD PRESSURE: 131 MMHG | HEART RATE: 73 BPM | BODY MASS INDEX: 22.86 KG/M2 | RESPIRATION RATE: 18 BRPM | WEIGHT: 154.32 LBS | HEIGHT: 69 IN | TEMPERATURE: 98.5 F

## 2021-09-02 LAB
ALBUMIN SERPL-MCNC: 3.4 G/DL (ref 3.5–5)
ALBUMIN/GLOB SERPL: 0.8 {RATIO} (ref 1.1–2.2)
ALP SERPL-CCNC: 124 U/L (ref 45–117)
ALT SERPL-CCNC: 24 U/L (ref 12–78)
ANION GAP SERPL CALC-SCNC: 4 MMOL/L (ref 5–15)
AST SERPL-CCNC: 18 U/L (ref 15–37)
BASOPHILS # BLD: 0.1 K/UL (ref 0–0.1)
BASOPHILS NFR BLD: 1 % (ref 0–1)
BILIRUB SERPL-MCNC: 0.2 MG/DL (ref 0.2–1)
BUN SERPL-MCNC: 8 MG/DL (ref 6–20)
BUN/CREAT SERPL: 14 (ref 12–20)
CALCIUM SERPL-MCNC: 8.7 MG/DL (ref 8.5–10.1)
CHLORIDE SERPL-SCNC: 107 MMOL/L (ref 97–108)
CO2 SERPL-SCNC: 29 MMOL/L (ref 21–32)
CREAT SERPL-MCNC: 0.59 MG/DL (ref 0.55–1.02)
DIFFERENTIAL METHOD BLD: ABNORMAL
EOSINOPHIL # BLD: 0.2 K/UL (ref 0–0.4)
EOSINOPHIL NFR BLD: 2 % (ref 0–7)
ERYTHROCYTE [DISTWIDTH] IN BLOOD BY AUTOMATED COUNT: 19 % (ref 11.5–14.5)
GLOBULIN SER CALC-MCNC: 4.2 G/DL (ref 2–4)
GLUCOSE SERPL-MCNC: 96 MG/DL (ref 65–100)
HCT VFR BLD AUTO: 39.2 % (ref 35–47)
HGB BLD-MCNC: 11.8 G/DL (ref 11.5–16)
IMM GRANULOCYTES # BLD AUTO: 0 K/UL (ref 0–0.04)
IMM GRANULOCYTES NFR BLD AUTO: 0 % (ref 0–0.5)
LYMPHOCYTES # BLD: 1.8 K/UL (ref 0.8–3.5)
LYMPHOCYTES NFR BLD: 23 % (ref 12–49)
MCH RBC QN AUTO: 22.9 PG (ref 26–34)
MCHC RBC AUTO-ENTMCNC: 30.1 G/DL (ref 30–36.5)
MCV RBC AUTO: 76.1 FL (ref 80–99)
MONOCYTES # BLD: 0.7 K/UL (ref 0–1)
MONOCYTES NFR BLD: 9 % (ref 5–13)
NEUTS SEG # BLD: 5 K/UL (ref 1.8–8)
NEUTS SEG NFR BLD: 65 % (ref 32–75)
NRBC # BLD: 0 K/UL (ref 0–0.01)
NRBC BLD-RTO: 0 PER 100 WBC
PLATELET # BLD AUTO: 267 K/UL (ref 150–400)
POTASSIUM SERPL-SCNC: 4.1 MMOL/L (ref 3.5–5.1)
PROT SERPL-MCNC: 7.6 G/DL (ref 6.4–8.2)
RBC # BLD AUTO: 5.15 M/UL (ref 3.8–5.2)
SODIUM SERPL-SCNC: 140 MMOL/L (ref 136–145)
WBC # BLD AUTO: 7.8 K/UL (ref 3.6–11)

## 2021-09-02 PROCEDURE — 36415 COLL VENOUS BLD VENIPUNCTURE: CPT

## 2021-09-02 PROCEDURE — 85025 COMPLETE CBC W/AUTO DIFF WBC: CPT

## 2021-09-02 PROCEDURE — 80053 COMPREHEN METABOLIC PANEL: CPT

## 2021-09-02 NOTE — PERIOP NOTES
Preoperative and medication instructions reviewed with patient and son instructions given in Bulgarian , surgical site infection sheet given,  chg soap x 2 given and instructions on how to use chg soap correctly. Patient given opportunity to ask questions and all questions were answered. Information given regarding covid testing and arrival to hospital on day of surgery.

## 2021-09-07 ENCOUNTER — HOSPITAL ENCOUNTER (OUTPATIENT)
Dept: PREADMISSION TESTING | Age: 41
Discharge: HOME | End: 2021-09-07
Payer: SUBSIDIZED

## 2021-09-07 DIAGNOSIS — Z01.812 ENCOUNTER FOR PREOPERATIVE SCREENING LABORATORY TESTING FOR COVID-19 VIRUS: ICD-10-CM

## 2021-09-07 DIAGNOSIS — Z20.822 ENCOUNTER FOR PREOPERATIVE SCREENING LABORATORY TESTING FOR COVID-19 VIRUS: ICD-10-CM

## 2021-09-07 LAB
SARS-COV-2, XPLCVT: NOT DETECTED
SOURCE, COVRS: NORMAL

## 2021-09-07 PROCEDURE — U0005 INFEC AGEN DETEC AMPLI PROBE: HCPCS

## 2021-09-08 ENCOUNTER — ANESTHESIA EVENT (OUTPATIENT)
Dept: SURGERY | Age: 41
End: 2021-09-08
Payer: SUBSIDIZED

## 2021-09-09 ENCOUNTER — ANESTHESIA (OUTPATIENT)
Dept: SURGERY | Age: 41
End: 2021-09-09
Payer: SUBSIDIZED

## 2021-09-09 ENCOUNTER — HOSPITAL ENCOUNTER (OUTPATIENT)
Age: 41
Setting detail: OUTPATIENT SURGERY
Discharge: HOME | End: 2021-09-09
Attending: OBSTETRICS & GYNECOLOGY | Admitting: OBSTETRICS & GYNECOLOGY
Payer: SUBSIDIZED

## 2021-09-09 VITALS
HEART RATE: 54 BPM | WEIGHT: 154.32 LBS | TEMPERATURE: 97.6 F | RESPIRATION RATE: 13 BRPM | BODY MASS INDEX: 22.86 KG/M2 | SYSTOLIC BLOOD PRESSURE: 100 MMHG | OXYGEN SATURATION: 100 % | DIASTOLIC BLOOD PRESSURE: 73 MMHG | HEIGHT: 69 IN

## 2021-09-09 LAB — HCG UR QL: NEGATIVE

## 2021-09-09 PROCEDURE — 77030040922 HC BLNKT HYPOTHRM STRY -A

## 2021-09-09 PROCEDURE — 76060000034 HC ANESTHESIA 1.5 TO 2 HR: Performed by: OBSTETRICS & GYNECOLOGY

## 2021-09-09 PROCEDURE — 76010000153 HC OR TIME 1.5 TO 2 HR: Performed by: OBSTETRICS & GYNECOLOGY

## 2021-09-09 PROCEDURE — 74011250636 HC RX REV CODE- 250/636: Performed by: NURSE ANESTHETIST, CERTIFIED REGISTERED

## 2021-09-09 PROCEDURE — 77030026438 HC STYL ET INTUB CARD -A: Performed by: ANESTHESIOLOGY

## 2021-09-09 PROCEDURE — 74011000250 HC RX REV CODE- 250: Performed by: NURSE ANESTHETIST, CERTIFIED REGISTERED

## 2021-09-09 PROCEDURE — 74011250636 HC RX REV CODE- 250/636: Performed by: OBSTETRICS & GYNECOLOGY

## 2021-09-09 PROCEDURE — 74011000258 HC RX REV CODE- 258: Performed by: OBSTETRICS & GYNECOLOGY

## 2021-09-09 PROCEDURE — 77030011265 HC ELECTRD BLD HEX COVD -A: Performed by: OBSTETRICS & GYNECOLOGY

## 2021-09-09 PROCEDURE — 81025 URINE PREGNANCY TEST: CPT

## 2021-09-09 PROCEDURE — 77030010011 HC RNG RETRCTR STAY COOP -B: Performed by: OBSTETRICS & GYNECOLOGY

## 2021-09-09 PROCEDURE — 74011250636 HC RX REV CODE- 250/636: Performed by: ANESTHESIOLOGY

## 2021-09-09 PROCEDURE — 77030031139 HC SUT VCRL2 J&J -A: Performed by: OBSTETRICS & GYNECOLOGY

## 2021-09-09 PROCEDURE — 77030034626 HC LIGASURE SM JAW SEAL OPN SURG COVD -E: Performed by: OBSTETRICS & GYNECOLOGY

## 2021-09-09 PROCEDURE — 74011250637 HC RX REV CODE- 250/637: Performed by: ANESTHESIOLOGY

## 2021-09-09 PROCEDURE — 76210000017 HC OR PH I REC 1.5 TO 2 HR: Performed by: OBSTETRICS & GYNECOLOGY

## 2021-09-09 PROCEDURE — 77030019908 HC STETH ESOPH SIMS -A: Performed by: ANESTHESIOLOGY

## 2021-09-09 PROCEDURE — 77030002933 HC SUT MCRYL J&J -A: Performed by: OBSTETRICS & GYNECOLOGY

## 2021-09-09 PROCEDURE — 88304 TISSUE EXAM BY PATHOLOGIST: CPT

## 2021-09-09 PROCEDURE — 77030008684 HC TU ET CUF COVD -B: Performed by: ANESTHESIOLOGY

## 2021-09-09 PROCEDURE — 2709999900 HC NON-CHARGEABLE SUPPLY: Performed by: OBSTETRICS & GYNECOLOGY

## 2021-09-09 PROCEDURE — 74011000250 HC RX REV CODE- 250: Performed by: OBSTETRICS & GYNECOLOGY

## 2021-09-09 PROCEDURE — 77030021052 HC RNG RETRCTR STAY COOP -A: Performed by: OBSTETRICS & GYNECOLOGY

## 2021-09-09 PROCEDURE — C9290 INJ, BUPIVACAINE LIPOSOME: HCPCS | Performed by: OBSTETRICS & GYNECOLOGY

## 2021-09-09 RX ORDER — MIDAZOLAM HYDROCHLORIDE 1 MG/ML
INJECTION, SOLUTION INTRAMUSCULAR; INTRAVENOUS AS NEEDED
Status: DISCONTINUED | OUTPATIENT
Start: 2021-09-09 | End: 2021-09-09 | Stop reason: HOSPADM

## 2021-09-09 RX ORDER — LIDOCAINE HYDROCHLORIDE 20 MG/ML
INJECTION, SOLUTION EPIDURAL; INFILTRATION; INTRACAUDAL; PERINEURAL AS NEEDED
Status: DISCONTINUED | OUTPATIENT
Start: 2021-09-09 | End: 2021-09-09 | Stop reason: HOSPADM

## 2021-09-09 RX ORDER — SODIUM CHLORIDE 9 MG/ML
25 INJECTION, SOLUTION INTRAVENOUS CONTINUOUS
Status: DISCONTINUED | OUTPATIENT
Start: 2021-09-09 | End: 2021-09-09 | Stop reason: HOSPADM

## 2021-09-09 RX ORDER — SUCCINYLCHOLINE CHLORIDE 20 MG/ML
INJECTION INTRAMUSCULAR; INTRAVENOUS AS NEEDED
Status: DISCONTINUED | OUTPATIENT
Start: 2021-09-09 | End: 2021-09-09 | Stop reason: HOSPADM

## 2021-09-09 RX ORDER — SODIUM CHLORIDE, SODIUM LACTATE, POTASSIUM CHLORIDE, CALCIUM CHLORIDE 600; 310; 30; 20 MG/100ML; MG/100ML; MG/100ML; MG/100ML
INJECTION, SOLUTION INTRAVENOUS
Status: DISCONTINUED | OUTPATIENT
Start: 2021-09-09 | End: 2021-09-09 | Stop reason: HOSPADM

## 2021-09-09 RX ORDER — MIDAZOLAM HYDROCHLORIDE 1 MG/ML
0.5 INJECTION, SOLUTION INTRAMUSCULAR; INTRAVENOUS
Status: DISCONTINUED | OUTPATIENT
Start: 2021-09-09 | End: 2021-09-09 | Stop reason: HOSPADM

## 2021-09-09 RX ORDER — FENTANYL CITRATE 50 UG/ML
INJECTION, SOLUTION INTRAMUSCULAR; INTRAVENOUS AS NEEDED
Status: DISCONTINUED | OUTPATIENT
Start: 2021-09-09 | End: 2021-09-09 | Stop reason: HOSPADM

## 2021-09-09 RX ORDER — MIDAZOLAM HYDROCHLORIDE 1 MG/ML
1 INJECTION, SOLUTION INTRAMUSCULAR; INTRAVENOUS AS NEEDED
Status: DISCONTINUED | OUTPATIENT
Start: 2021-09-09 | End: 2021-09-09 | Stop reason: HOSPADM

## 2021-09-09 RX ORDER — SODIUM CHLORIDE 0.9 % (FLUSH) 0.9 %
5-40 SYRINGE (ML) INJECTION AS NEEDED
Status: DISCONTINUED | OUTPATIENT
Start: 2021-09-09 | End: 2021-09-09 | Stop reason: HOSPADM

## 2021-09-09 RX ORDER — MORPHINE SULFATE 2 MG/ML
2 INJECTION, SOLUTION INTRAMUSCULAR; INTRAVENOUS
Status: DISCONTINUED | OUTPATIENT
Start: 2021-09-09 | End: 2021-09-09 | Stop reason: HOSPADM

## 2021-09-09 RX ORDER — ONDANSETRON 2 MG/ML
4 INJECTION INTRAMUSCULAR; INTRAVENOUS AS NEEDED
Status: DISCONTINUED | OUTPATIENT
Start: 2021-09-09 | End: 2021-09-09 | Stop reason: HOSPADM

## 2021-09-09 RX ORDER — ROCURONIUM BROMIDE 10 MG/ML
INJECTION, SOLUTION INTRAVENOUS AS NEEDED
Status: DISCONTINUED | OUTPATIENT
Start: 2021-09-09 | End: 2021-09-09 | Stop reason: HOSPADM

## 2021-09-09 RX ORDER — LIDOCAINE HYDROCHLORIDE 10 MG/ML
0.1 INJECTION, SOLUTION EPIDURAL; INFILTRATION; INTRACAUDAL; PERINEURAL AS NEEDED
Status: DISCONTINUED | OUTPATIENT
Start: 2021-09-09 | End: 2021-09-09 | Stop reason: HOSPADM

## 2021-09-09 RX ORDER — DEXAMETHASONE SODIUM PHOSPHATE 4 MG/ML
INJECTION, SOLUTION INTRA-ARTICULAR; INTRALESIONAL; INTRAMUSCULAR; INTRAVENOUS; SOFT TISSUE AS NEEDED
Status: DISCONTINUED | OUTPATIENT
Start: 2021-09-09 | End: 2021-09-09 | Stop reason: HOSPADM

## 2021-09-09 RX ORDER — KETAMINE HYDROCHLORIDE 10 MG/ML
INJECTION, SOLUTION INTRAMUSCULAR; INTRAVENOUS AS NEEDED
Status: DISCONTINUED | OUTPATIENT
Start: 2021-09-09 | End: 2021-09-09 | Stop reason: HOSPADM

## 2021-09-09 RX ORDER — ACETAMINOPHEN 325 MG/1
650 TABLET ORAL ONCE
Status: COMPLETED | OUTPATIENT
Start: 2021-09-09 | End: 2021-09-09

## 2021-09-09 RX ORDER — SODIUM CHLORIDE, SODIUM LACTATE, POTASSIUM CHLORIDE, CALCIUM CHLORIDE 600; 310; 30; 20 MG/100ML; MG/100ML; MG/100ML; MG/100ML
125 INJECTION, SOLUTION INTRAVENOUS CONTINUOUS
Status: DISCONTINUED | OUTPATIENT
Start: 2021-09-09 | End: 2021-09-09 | Stop reason: HOSPADM

## 2021-09-09 RX ORDER — DEXMEDETOMIDINE HYDROCHLORIDE 100 UG/ML
INJECTION, SOLUTION INTRAVENOUS AS NEEDED
Status: DISCONTINUED | OUTPATIENT
Start: 2021-09-09 | End: 2021-09-09 | Stop reason: HOSPADM

## 2021-09-09 RX ORDER — FENTANYL CITRATE 50 UG/ML
25 INJECTION, SOLUTION INTRAMUSCULAR; INTRAVENOUS
Status: DISCONTINUED | OUTPATIENT
Start: 2021-09-09 | End: 2021-09-09 | Stop reason: HOSPADM

## 2021-09-09 RX ORDER — DIPHENHYDRAMINE HYDROCHLORIDE 50 MG/ML
12.5 INJECTION, SOLUTION INTRAMUSCULAR; INTRAVENOUS AS NEEDED
Status: DISCONTINUED | OUTPATIENT
Start: 2021-09-09 | End: 2021-09-09 | Stop reason: HOSPADM

## 2021-09-09 RX ORDER — ONDANSETRON 2 MG/ML
INJECTION INTRAMUSCULAR; INTRAVENOUS AS NEEDED
Status: DISCONTINUED | OUTPATIENT
Start: 2021-09-09 | End: 2021-09-09 | Stop reason: HOSPADM

## 2021-09-09 RX ORDER — SODIUM CHLORIDE 0.9 % (FLUSH) 0.9 %
5-40 SYRINGE (ML) INJECTION EVERY 8 HOURS
Status: DISCONTINUED | OUTPATIENT
Start: 2021-09-09 | End: 2021-09-09 | Stop reason: HOSPADM

## 2021-09-09 RX ORDER — PROPOFOL 10 MG/ML
INJECTION, EMULSION INTRAVENOUS AS NEEDED
Status: DISCONTINUED | OUTPATIENT
Start: 2021-09-09 | End: 2021-09-09 | Stop reason: HOSPADM

## 2021-09-09 RX ORDER — HYDROMORPHONE HYDROCHLORIDE 1 MG/ML
0.2 INJECTION, SOLUTION INTRAMUSCULAR; INTRAVENOUS; SUBCUTANEOUS
Status: DISCONTINUED | OUTPATIENT
Start: 2021-09-09 | End: 2021-09-09 | Stop reason: HOSPADM

## 2021-09-09 RX ORDER — FENTANYL CITRATE 50 UG/ML
50 INJECTION, SOLUTION INTRAMUSCULAR; INTRAVENOUS AS NEEDED
Status: DISCONTINUED | OUTPATIENT
Start: 2021-09-09 | End: 2021-09-09 | Stop reason: HOSPADM

## 2021-09-09 RX ORDER — ROPIVACAINE HYDROCHLORIDE 5 MG/ML
30 INJECTION, SOLUTION EPIDURAL; INFILTRATION; PERINEURAL ONCE
Status: DISCONTINUED | OUTPATIENT
Start: 2021-09-09 | End: 2021-09-09 | Stop reason: HOSPADM

## 2021-09-09 RX ORDER — SODIUM CHLORIDE, SODIUM LACTATE, POTASSIUM CHLORIDE, CALCIUM CHLORIDE 600; 310; 30; 20 MG/100ML; MG/100ML; MG/100ML; MG/100ML
75 INJECTION, SOLUTION INTRAVENOUS CONTINUOUS
Status: DISCONTINUED | OUTPATIENT
Start: 2021-09-09 | End: 2021-09-09 | Stop reason: HOSPADM

## 2021-09-09 RX ORDER — HYDROMORPHONE HYDROCHLORIDE 2 MG/ML
INJECTION, SOLUTION INTRAMUSCULAR; INTRAVENOUS; SUBCUTANEOUS AS NEEDED
Status: DISCONTINUED | OUTPATIENT
Start: 2021-09-09 | End: 2021-09-09 | Stop reason: HOSPADM

## 2021-09-09 RX ORDER — LIDOCAINE HYDROCHLORIDE AND EPINEPHRINE 10; 10 MG/ML; UG/ML
INJECTION, SOLUTION INFILTRATION; PERINEURAL AS NEEDED
Status: DISCONTINUED | OUTPATIENT
Start: 2021-09-09 | End: 2021-09-09 | Stop reason: HOSPADM

## 2021-09-09 RX ADMIN — PROPOFOL 150 MG: 10 INJECTION, EMULSION INTRAVENOUS at 07:46

## 2021-09-09 RX ADMIN — ONDANSETRON HYDROCHLORIDE 4 MG: 2 INJECTION, SOLUTION INTRAMUSCULAR; INTRAVENOUS at 07:51

## 2021-09-09 RX ADMIN — FENTANYL CITRATE 50 MCG: 50 INJECTION, SOLUTION INTRAMUSCULAR; INTRAVENOUS at 08:04

## 2021-09-09 RX ADMIN — MIDAZOLAM 2 MG: 1 INJECTION INTRAMUSCULAR; INTRAVENOUS at 07:35

## 2021-09-09 RX ADMIN — ROCURONIUM BROMIDE 30 MG: 10 SOLUTION INTRAVENOUS at 07:50

## 2021-09-09 RX ADMIN — ONDANSETRON 4 MG: 2 INJECTION INTRAMUSCULAR; INTRAVENOUS at 11:15

## 2021-09-09 RX ADMIN — DEXAMETHASONE SODIUM PHOSPHATE 4 MG: 4 INJECTION, SOLUTION INTRAMUSCULAR; INTRAVENOUS at 07:51

## 2021-09-09 RX ADMIN — HYDROMORPHONE HYDROCHLORIDE 0.75 MG: 2 INJECTION, SOLUTION INTRAMUSCULAR; INTRAVENOUS; SUBCUTANEOUS at 09:09

## 2021-09-09 RX ADMIN — DEXMEDETOMIDINE HYDROCHLORIDE 10 MCG: 100 INJECTION, SOLUTION, CONCENTRATE INTRAVENOUS at 08:26

## 2021-09-09 RX ADMIN — HYDROMORPHONE HYDROCHLORIDE 0.5 MG: 2 INJECTION, SOLUTION INTRAMUSCULAR; INTRAVENOUS; SUBCUTANEOUS at 08:51

## 2021-09-09 RX ADMIN — ACETAMINOPHEN 650 MG: 325 TABLET ORAL at 07:30

## 2021-09-09 RX ADMIN — FENTANYL CITRATE 50 MCG: 50 INJECTION, SOLUTION INTRAMUSCULAR; INTRAVENOUS at 07:46

## 2021-09-09 RX ADMIN — SODIUM CHLORIDE, POTASSIUM CHLORIDE, SODIUM LACTATE AND CALCIUM CHLORIDE 125 ML/HR: 600; 310; 30; 20 INJECTION, SOLUTION INTRAVENOUS at 07:35

## 2021-09-09 RX ADMIN — KETAMINE HYDROCHLORIDE 25 MG: 10 INJECTION, SOLUTION INTRAMUSCULAR; INTRAVENOUS at 07:46

## 2021-09-09 RX ADMIN — LIDOCAINE HYDROCHLORIDE 50 MG: 20 INJECTION, SOLUTION EPIDURAL; INFILTRATION; INTRACAUDAL; PERINEURAL at 07:46

## 2021-09-09 RX ADMIN — HYDROMORPHONE HYDROCHLORIDE 0.25 MG: 2 INJECTION, SOLUTION INTRAMUSCULAR; INTRAVENOUS; SUBCUTANEOUS at 09:03

## 2021-09-09 RX ADMIN — SUCCINYLCHOLINE CHLORIDE 140 MG: 20 INJECTION, SOLUTION INTRAMUSCULAR; INTRAVENOUS at 07:46

## 2021-09-09 RX ADMIN — SODIUM CHLORIDE, POTASSIUM CHLORIDE, SODIUM LACTATE AND CALCIUM CHLORIDE: 600; 310; 30; 20 INJECTION, SOLUTION INTRAVENOUS at 07:27

## 2021-09-09 RX ADMIN — CEFOXITIN SODIUM 2 G: 2 POWDER, FOR SOLUTION INTRAVENOUS at 07:54

## 2021-09-09 RX ADMIN — ROCURONIUM BROMIDE 10 MG: 10 SOLUTION INTRAVENOUS at 07:46

## 2021-09-09 NOTE — H&P
[address] [place] [address] [address], [address] [place]  P [phone]  F [phone]    Office Note  Patient ID:  Name:  Ginger Ramirez  MRN:  [id number]  :  1980/40 y.o. Date:  2021      HISTORY OF PRESENT ILLNESS:  Ms. Ginger Ramirez is a 45 y.o. female who initially presented from [facility] for concerns of a possible uterine mass. MRI pelvis 2021:  \"1. Normal sized uterus contains multiple fibroids. Largest fibroid measures 2.6  cm and is submucosal in the fundus. This finding corresponds to the ultrasound  finding. No imaging evidence of endometrial carcinoma. If the patient has  abnormal uterine bleeding, consider biopsy to prove fibroid rather than  carcinoma. 2. 5.7 cm Bartholin's gland cyst to the right of midline may be palpable in the  labia majora. 1.1 cm left Bartholin's gland cyst.  3. Normal ovaries. \"    Presents back today to view Bartholin's cyst, to see if it is still present. She denies any symptoms. Initial History:  Ms. Ginger Ramirez is a 45 y.o.  premenopausal female who presents as a new patient from [facility] for concerns of a possible uterine mass. Patient reports RLQ severe stabbing pain in 2021. Denies nausea or vomiting. Denies change in appetite or bowel habits. Denies change in menses. Denies fevers or chills. Denies constipation or diarrhea. Reports that the pain has completely resolved now. She reports the pain started when she came off of her OCPs. Now that she has restarted them her pain has resolved. Denies CP or SOB. Negative UPT at that time. Pelvic ultrasound on 2021 demonstrated \"uterien mass, with carcinoma not excluded\". She was then subsequently referred to our office for further discussion and management. Pertinent PMH/PSH: n/a      Active, no restrictions. Imaging Review:   MRI pelvis 2021:  FINDINGS: The uterus measures 9.3 x 5.6 x 4.1 cm. Hypointense T2 enhancing  submucosal fibroid at the fundus measures 2.6 x 2.6 x 2.4 cm. Intramural  posterior fundal fibroid measures 1.7 cm. Other fibroids are smaller. The endometrial stripe measures 1.7 cm. The junctional zone measures 0.5 cm. The  cervix is within normal limits. Hyperintense T2 nonenhancing cyst at the base of the distal vagina to the right  of midline extends into the right labia majora and measures 5.7 x 2.9 x 2.3 cm. A cyst at the left base of the vagina measures 1.1 cm in craniocaudal dimension. Both of these cysts are inferior to the pubic symphysis. No solidly enhancing  vaginal mass. The right ovary measures 2.7 x 2.3 x 2.2 cm. The left ovary measures 3.4 x 1.5 x  2.5 cm. No solid adnexal mass. Rectum and anus are within normal limits. Urinary bladder is within normal  limits. There is no free fluid. Bones are within normal limits. IMPRESSION  1. Normal sized uterus contains multiple fibroids. Largest fibroid measures 2.6  cm and is submucosal in the fundus. This finding corresponds to the ultrasound  finding. No imaging evidence of endometrial carcinoma. If the patient has  abnormal uterine bleeding, consider biopsy to prove fibroid rather than  carcinoma. 2. 5.7 cm Bartholin's gland cyst to the right of midline may be palpable in the  labia majora. 1.1 cm left Bartholin's gland cyst.  3. Normal ovaries. Pelvic ultrasound 5/4/2021:  \"A midline smooth oval solid uterine mass with internal vascularity measures 2.6 x 2.5 x 2.0cm and fills and distends the fundal portion of th endometrial cavity. Differential considerations include endometrial carcinoma, endometrial polyp, submucosal fibroid. \"    ROS:  A comprehensive review of systems was negative except for that written in the History of Present Illness. , 10 point ROS    OB/GYN ROS:  Patient denies significant menstrual problems.     ECOG stGstrstastdstest:st st1st Problem List:  Patient Active Problem List    Diagnosis Date Noted    Bartholin's cyst 06/16/2021    Uterine mass 06/02/2021     PMH:  Past Medical History:   Diagnosis Date    Abnormal Papanicolaou smear of cervix 04/2018    ASCUS    Rosacea       PSH:  Past Surgical History:   Procedure Laterality Date    HX BREAST AUGMENTATION Bilateral 2008    HX COLPOSCOPY  06/2020    HX LEEP PROCEDURE      JONH 3      Social History:  Social History     Tobacco Use    Smoking status: Never Smoker    Smokeless tobacco: Never Used   Substance Use Topics    Alcohol use: No      Family History:  Family History   Problem Relation Age of Onset    Hypertension Mother     Diabetes Maternal Grandmother     Cancer Maternal Grandfather         colon cancer    No Known Problems Son     No Known Problems Son     Anesth Problems Neg Hx       Medications: (reviewed)  Current Facility-Administered Medications   Medication Dose Route Frequency    lactated Ringers infusion  125 mL/hr IntraVENous CONTINUOUS    0.9% sodium chloride infusion  25 mL/hr IntraVENous CONTINUOUS    sodium chloride (NS) flush 5-40 mL  5-40 mL IntraVENous Q8H    sodium chloride (NS) flush 5-40 mL  5-40 mL IntraVENous PRN    lidocaine (PF) (XYLOCAINE) 10 mg/mL (1 %) injection 0.1 mL  0.1 mL SubCUTAneous PRN    fentaNYL citrate (PF) injection 50 mcg  50 mcg IntraVENous PRN    midazolam (VERSED) injection 1 mg  1 mg IntraVENous PRN    midazolam (VERSED) injection 1 mg  1 mg IntraVENous PRN    acetaminophen (TYLENOL) tablet 650 mg  650 mg Oral ONCE    ropivacaine (PF) (NAROPIN) 5 mg/mL (0.5 %) injection 30 mL  30 mL Peripheral Nerve Block ONCE     Allergies: (reviewed)  No Known Allergies     Gyn History:   Last pap: ASC-H  2017 being followed up by Dr. Calhoun Dry per patient  History of abnormal pap: yes, h/o JONH-3 s/p prior LEEP  Menses: regular  Contraception: OCPs     OBJECTIVE:    Physical Exam:  VITAL SIGNS: There were no vitals filed for this visit. There is no height or weight on file to calculate BMI. GENERAL NIRAJ: Conversant, alert, oriented. No acute distress. HEENT: HEENT. No thyroid enlargement. No JVD. Neck: Supple without restrictions. RESPIRATORY: Clear to auscultation and percussion to the bases. No CVAT. CARDIOVASC: RRR without murmur/rub. GASTROINT: soft, non-tender, without masses or organomegaly   MUSCULOSKEL: no joint tenderness, deformity or swelling       EXTREMITIES: extremities normal, atraumatic, no cyanosis or edema   PELVIC: Exam chaperoned by nurse. Normal appearing external genitalia. She has a 3-4cm Bartholin's cyst on the right. On speculum exam, normal appearing vagina and cervix. On bimanual exam, the cervix and uterus are normal size and mobile. There is a small 2-3cm anterior fibroid. No evidence of adnexal masses or nodularity. RECTAL: deferred   DEVI SURVEY: No suspicious lymphadenopathy or edema noted. NEURO: Grossly intact. No acute deficit. Lab Date as available:    Lab Results   Component Value Date/Time    WBC 7.8 09/02/2021 03:22 PM    HGB 11.8 09/02/2021 03:22 PM    HCT 39.2 09/02/2021 03:22 PM    PLATELET 373 06/76/1120 03:22 PM    MCV 76.1 (L) 09/02/2021 03:22 PM     Lab Results   Component Value Date/Time    Sodium 140 09/02/2021 03:22 PM    Potassium 4.1 09/02/2021 03:22 PM    Chloride 107 09/02/2021 03:22 PM    CO2 29 09/02/2021 03:22 PM    Anion gap 4 (L) 09/02/2021 03:22 PM    Glucose 96 09/02/2021 03:22 PM    BUN 8 09/02/2021 03:22 PM    Creatinine 0.59 09/02/2021 03:22 PM    BUN/Creatinine ratio 14 09/02/2021 03:22 PM    GFR est AA >60 09/02/2021 03:22 PM    GFR est non-AA >60 09/02/2021 03:22 PM    Calcium 8.7 09/02/2021 03:22 PM         IMPRESSION/PLAN:    Ms. Missael Nunez is a 45 y.o. female with a working diagnosis of possible uterine mass. Presents back today for MRI follow-up. MRI pelvis 6/1/2021 demonstrates normal appearing ovaries and normal appearing multiple uterine fibroids, largest measuring 2.6cm.  Hilda's cyst also imaged. Presents back today to discuss the Bartholin's cyst.     Problems:     Patient Active Problem List    Diagnosis Date Noted    Bartholin's cyst 06/16/2021    Uterine mass 06/02/2021     Reviewed patient's course to date, including her MRI results which demonstrate normal appearing fibroids and no evidence of a uterine mass. Her Bartholin's cyst seen on her initial exam and on her MRI is still present. I have recommended and EUA, possible drainage, possible marsupialization, possible Bartholin's cyst resection. Will post at the patient's convenience. Reviewed risks, benefits, indications, and alternatives of surgery. Will collect CBCD, CMP, CXR, and EKG. Per the patient she is following up with Dr. Rain Schilling regarding her ASC-H pap smear in 2017. Today's visit performed with an . All questions and concerns were addressed with the patient and she is comfortable with the plan. An electronic signature was used to authenticate this note. Gino Islas MD     Date of Surgery Update:  [facility] was seen and examined. History and physical has been reviewed. The patient has been examined. There have been no significant clinical changes since the completion of the originally dated History and Physical. Plan for likely marsupialization of the gland as she has had a prior I&D.      Signed By: Gino Islas MD     September 9, 2021 7:01 AM

## 2021-09-09 NOTE — PROGRESS NOTES
0945: Called Dr. Leena Daniels office and s/w JORDAN Motley RN, okay to send bacitracin ointment home with pt and have her apply to site daily. 1010: Discharge instructions reviewed via phone with permission with pt son Kavon Alcantara, opportunity for questions provided. 1055: Pt with asymptomatic bradycardia 45-50. Dr. Mary Ayers made aware, no new orders okay for discharge home.

## 2021-09-09 NOTE — BRIEF OP NOTE
Brief Postoperative Note    Patient: Kylah Feldman  YOB: 1980  MRN: [id number]    Date of Procedure: 9/9/2021     Pre-Op Diagnosis: BARTHOLIN CYST    Post-Op Diagnosis: Same as preoperative diagnosis. Procedure(s):  BARTHOLIN GLAND EXCISION    Surgeon(s): Julia Goldman MD    Surgical Assistant: None    Anesthesia: General     Estimated Blood Loss (mL): less than 213     Complications: None    Specimens:   ID Type Source Tests Collected by Time Destination   1 : BARTHOLIN CYST Fresh Bartholin Cyst  Julia Goldman MD 9/9/2021 0820 Pathology        Implants: * No implants in log *    Drains: * No LDAs found *    Findings: On exam under anesthesia, the left Bartholin's cyst had a firm 1cm nodule, which appeared to be the stopped up gland leading to the vagina. The Bartholin's cyst drained approximately 30cc of thick yellowish fluid consistent with a Batholin's cyst. No evidence of infection. Given the dilated and blocked gland with a firm nodule, the entire gland was resected.      Electronically Signed by Kiesha Fuentes MD on 9/9/2021 at 9:12 AM

## 2021-09-09 NOTE — ANESTHESIA POSTPROCEDURE EVALUATION
Post-Anesthesia Evaluation and Assessment    Patient: Chau Delarosa MRN: [id number]  SSN: [id number]    YOB: 1980  Age: 45 y.o. Sex: female      I have evaluated the patient and they are stable and ready for discharge from the PACU. Cardiovascular Function/Vital Signs  Visit Vitals  /65   Pulse 60   Temp 36.4 °C (97.6 °F)   Resp 15   Ht 5' 9\" (1.753 m)   Wt 70 kg (154 lb 5.2 oz)   SpO2 100%   BMI 22.79 kg/m²       Patient is status post General anesthesia for Procedure(s):  BARTHOLIN GLAND EXCISION. Nausea/Vomiting: None    Postoperative hydration reviewed and adequate. Pain:  Pain Scale 1: Numeric (0 - 10) (09/09/21 0951)  Pain Intensity 1: 0 (09/09/21 0951)   Managed    Neurological Status:   Neuro (WDL): Exceptions to [facility] (09/09/21 2887)  Neuro  Neurologic State: Drowsy; Eyes open to voice (09/09/21 0928)  LUE Motor Response: Spontaneous  (09/09/21 0928)  LLE Motor Response: Spontaneous  (09/09/21 0928)  RUE Motor Response: Spontaneous  (09/09/21 0928)  RLE Motor Response: Spontaneous  (09/09/21 0928)   At baseline    Mental Status, Level of Consciousness: Alert and  oriented to person, place, and time    Pulmonary Status:   O2 Device: None (09/09/21 0928)   Adequate oxygenation and airway patent    Complications related to anesthesia: None    Post-anesthesia assessment completed. No concerns    Signed By: Julianne Darling MD     September 9, 2021              Procedure(s):  BARTHOLIN GLAND EXCISION. general    <BSHSIANPOST>    INITIAL Post-op Vital signs:   Vitals Value Taken Time   /65 09/09/21 1000   Temp 36.4 °C (97.6 °F) 09/09/21 0928   Pulse 45 09/09/21 1015   Resp 12 09/09/21 1015   SpO2 99 % 09/09/21 1015   Vitals shown include unvalidated device data.

## 2021-09-09 NOTE — DISCHARGE INSTRUCTIONS
[facility], [address], [address]  P [phone]  F [phone]     [facility]      Dear Ms. Bogdan Estrada,      Please review your instructions with your nurse and ask any questions so you have all the information you need to recover well at home. If you do not feel you have everything you need to succeed and be safe after you leave the hospital, please discuss these concerns with your nurse. As always, call for any questions at home. Your doctor: Dilcia Cooper MD   Diagnosis: BARTHOLIN CYST  Procedure: Procedure(s):  BARTHOLIN GLAND EXCISION  Date of Discharge: 09/09/21       Take Home Medications     See Discharge Medication Review provided to you by your nurse. If you did not receive one, request this prior to your discharge. · It is important that you take your medications as they are prescribed. · Keep your medications in the bottles provided by the pharmacist and keep a list of the medication names, dosages, times to be taken and what they are for in your wallet. · Do not take other medications without consulting your doctor. Activity    · If possible, have someone with you at all times until you feel stable. · Gradually increase your activity each day. There are generally no restrictions on walking, climbing stairs or riding in a car. Try to walk at least 4 times per day. · Showers are okay. If you have an incision, no tub bathing/swimming for two weeks. · No driving for 2 week and/or while on pain medication. · Nothing per vagina for 4 weeks    Incision    · You should expect some discomfort in the area of your incision, particularly as you increase your activity. If you notice an area of increasing redness or new drainage, please call your doctor.       Causes For Concern    If any of the following occur, please call our office and speak with the Nurse/aid who will help you with your problem or ask the doctor to call you.  Problems with the incision, including increasing pain, swelling, redness or drainage.  Inability to pass urine    Fever or chills and a temperature >101F   Constipation (no bowel movement for three days).  Diarrhea (more than three watery stools within 24 hours).  Excessive vaginal or wound bleeding.  If after hours and you cannot reach an on-call physician, call 911. Follow-Up    Call [phone] to schedule an appointment with Yeni Nino MD  in 4 weeks. Information obtained by :  I understand that if any problems occur once I am at home I am to contact my physician. I understand and acknowledge receipt of the instructions indicated above. Physician's or R.N.'s Signature                                                                  Date/Time                                                                                                                                              Patient or Representative Signature                                                          Date/Time      ______________________________________________________________________    Anesthesia Discharge Instructions    After general anesthesia or intervenous sedation, for 24 hours or while taking prescription Narcotics:  · Limit your activities  · Do not drive or operate hazardous machinery  · If you have not urinated within 8 hours after discharge, please contact your surgeon on call.   · Do not make important personal or business decisions  · Do not drink alcoholic beverages    Report the following to your surgeon:  · Excessive pain, swelling, redness or odor of or around the surgical area  · Temperature over 100.5 degrees  · Nausea and vomiting lasting longer than 4 hours or if unable to take medication  · Any signs of decreased circulation or nerve impairment to extremity:  Change in color, persistent numbness, tingling, coldness or increased pain.   · Any questions

## 2021-09-09 NOTE — PERIOP NOTES
[employer] Video  #[id number] used for Pre Op interview. No changes in health history since initial PAT interview.

## 2021-09-10 NOTE — OP NOTES
Gynecologic Oncology Operative Report    Randolph Berry Casadiegmahogany    Pre-operative dx: 1) 5cm Right Bartholin's cyst    Post-operative dx: 1) 5cm Right Bartholin's cyst    Procedure:  Right Bartholin's gland excision    Surgeon:  Myron Brunner, MD    Assistant: n/a     Anesthesia:  GETA     EBL:  <19DR    Complications:  None    Implants: none    Specimens: Bartholin's gland    Operative indications: 45 y.o. female with 5cm right Bartholin's gland     Operative findings: On exam under anesthesia, the left Bartholin's cyst had a firm 1cm nodule, which appeared to be the stopped up gland leading to the vagina. The Bartholin's cyst drained approximately 30cc of thick yellowish fluid consistent with a Batholin's cyst. No evidence of infection. Given the dilated and blocked gland with a firm nodule, the entire gland was resected. Operative report: After the risks, benefits, indications, and alternatives of the procedure were discussed with the patient and informed consent was obtained, the patient was taken to the operating room. She was positively identified, administered general anesthesia, and then placed in the dorsal lithotomy position in [address] [place] [address]. An exam under anesthesia was performed. The bladder was drained with a red rubber catheter. A anterior-posterior incision was made along the right vaginal introitus along the Bartholin's cyst. The vaginal and vulvar skin were dissected laterally and medially around the cyst. To better delineate the extent of cyst, the cyst was drained and examined as per above. The cyst extended up along the right labia majora. Using the Bovie electrocautery and the LigaSure, the Bartholin's cyst was excised entirely. The base of the resection bed was made hemostatic with Bovie electrocautery and interrupted 3-0 Vicryl figure-of-8 sutures. The resection bed was then irrigated and closed in 2 layers using interrupted 2-0 Vicryl sutures on the first layer.  The skin edges were then re-approximated with 2-0 Vicryl using simple-interrupted sutures. The vulva and perineum were irrigated and hemostasis confirmed. Exparel was injected along the base of the resection and the incision site. Bacitracin was applied to the wound edges. The patient was taken out of stirrups, awakened from anesthesia, and taken to the recovery room in stable condition. All instrument, sponge, and needle counts were correct.         Yeni Nino MD  9/9/2021

## 2021-10-04 NOTE — PROGRESS NOTES
Post op Visit, surgery was on 9/9/2021    Vitals:    10/06/21 0810 10/06/21 0824   BP: (!) 145/104 (!) 142/94   BP 1 Location: Left arm Left arm   BP Patient Position: Sitting Sitting   Pulse: 75 77   Height: 5' 9\" (1.753 m)    Weight: 151 lb (68.5 kg)          1. Have you been to the ER, urgent care clinic since your last visit? Hospitalized since your last visit? Yes, surgery with Dr. Geo Mcneil on 9/9/2021    2. Have you seen or consulted any other health care providers outside of the [address] [place] [address] since your last visit? Include any pap smears or colon screening.    no

## 2021-10-06 ENCOUNTER — OFFICE VISIT (OUTPATIENT)
Dept: GYNECOLOGY | Age: 41
End: 2021-10-06
Payer: SUBSIDIZED

## 2021-10-06 VITALS
HEART RATE: 77 BPM | DIASTOLIC BLOOD PRESSURE: 94 MMHG | SYSTOLIC BLOOD PRESSURE: 142 MMHG | BODY MASS INDEX: 22.36 KG/M2 | HEIGHT: 69 IN | WEIGHT: 151 LBS

## 2021-10-06 DIAGNOSIS — N75.0 BARTHOLIN'S CYST: Primary | ICD-10-CM

## 2021-10-06 PROCEDURE — 99024 POSTOP FOLLOW-UP VISIT: CPT | Performed by: OBSTETRICS & GYNECOLOGY

## 2021-10-06 NOTE — PROGRESS NOTES
[address] [place] [address] [address], [address] [facility]  P [phone]  F [phone]    Office Note  Patient ID:  Name:  Severiano Skeens  MRN:  [id number]  :  1980/40 y.o. Date:  10/6/2021      HISTORY OF PRESENT ILLNESS:  Ms. Severiano Skeens is a 45 y.o. female who initially presented from [facility] for concerns of a possible uterine mass. MRI pelvis 2021:  \"1. Normal sized uterus contains multiple fibroids. Largest fibroid measures 2.6  cm and is submucosal in the fundus. This finding corresponds to the ultrasound  finding. No imaging evidence of endometrial carcinoma. If the patient has  abnormal uterine bleeding, consider biopsy to prove fibroid rather than  carcinoma. 2. 5.7 cm Bartholin's gland cyst to the right of midline may be palpable in the  labia majora. 1.1 cm left Bartholin's gland cyst.  3. Normal ovaries. \"    MRI pelvis 2021 demonstrates normal appearing ovaries and normal appearing multiple uterine fibroids, largest measuring 2.6cm. Bartholin's cyst also imaged. On 2021 underwent Right Bartholin's gland excision. Final pathology consistent with Bartholin's gland cyst.    Presents back today for postoperative visit. Doing well without complaints. Initial History:  Ms. Severiano Skeens is a 45 y.o.  premenopausal female who presents as a new patient from [facility] for concerns of a possible uterine mass. Patient reports RLQ severe stabbing pain in 2021. Denies nausea or vomiting. Denies change in appetite or bowel habits. Denies change in menses. Denies fevers or chills. Denies constipation or diarrhea. Reports that the pain has completely resolved now. She reports the pain started when she came off of her OCPs. Now that she has restarted them her pain has resolved. Denies CP or SOB. Negative UPT at that time.  Pelvic ultrasound on 2021 demonstrated \"uterien mass, with carcinoma not excluded\". She was then subsequently referred to our office for further discussion and management. Pertinent PMH/PSH: n/a      Active, no restrictions. Imaging Review:   MRI pelvis 6/1/2021:  FINDINGS: The uterus measures 9.3 x 5.6 x 4.1 cm. Hypointense T2 enhancing  submucosal fibroid at the fundus measures 2.6 x 2.6 x 2.4 cm. Intramural  posterior fundal fibroid measures 1.7 cm. Other fibroids are smaller. The endometrial stripe measures 1.7 cm. The junctional zone measures 0.5 cm. The  cervix is within normal limits. Hyperintense T2 nonenhancing cyst at the base of the distal vagina to the right  of midline extends into the right labia majora and measures 5.7 x 2.9 x 2.3 cm. A cyst at the left base of the vagina measures 1.1 cm in craniocaudal dimension. Both of these cysts are inferior to the pubic symphysis. No solidly enhancing  vaginal mass. The right ovary measures 2.7 x 2.3 x 2.2 cm. The left ovary measures 3.4 x 1.5 x  2.5 cm. No solid adnexal mass. Rectum and anus are within normal limits. Urinary bladder is within normal  limits. There is no free fluid. Bones are within normal limits. IMPRESSION  1. Normal sized uterus contains multiple fibroids. Largest fibroid measures 2.6  cm and is submucosal in the fundus. This finding corresponds to the ultrasound  finding. No imaging evidence of endometrial carcinoma. If the patient has  abnormal uterine bleeding, consider biopsy to prove fibroid rather than  carcinoma. 2. 5.7 cm Bartholin's gland cyst to the right of midline may be palpable in the  labia majora. 1.1 cm left Bartholin's gland cyst.  3. Normal ovaries. Pelvic ultrasound 5/4/2021:  \"A midline smooth oval solid uterine mass with internal vascularity measures 2.6 x 2.5 x 2.0cm and fills and distends the fundal portion of th endometrial cavity. Differential considerations include endometrial carcinoma, endometrial polyp, submucosal fibroid. \"    ROS:  A comprehensive review of systems was negative except for that written in the History of Present Illness. , 10 point ROS    OB/GYN ROS:  Patient denies significant menstrual problems. ECOG stGstrstastdstest:st st1st Problem List:  Patient Active Problem List    Diagnosis Date Noted    Bartholin's cyst 06/16/2021    Uterine mass 06/02/2021     PMH:  Past Medical History:   Diagnosis Date    Abnormal Papanicolaou smear of cervix 04/2018    ASCUS    Rosacea       PSH:  Past Surgical History:   Procedure Laterality Date    HX BREAST AUGMENTATION Bilateral 2008    HX COLPOSCOPY  06/2020    HX LEEP PROCEDURE      JONH 3      Social History:  Social History     Tobacco Use    Smoking status: Never Smoker    Smokeless tobacco: Never Used   Substance Use Topics    Alcohol use: No      Family History:  Family History   Problem Relation Age of Onset    Hypertension Mother     Diabetes Maternal Grandmother     Cancer Maternal Grandfather         colon cancer    No Known Problems Son     No Known Problems Son     Anesth Problems Neg Hx       Medications: (reviewed)  Current Outpatient Medications   Medication Sig    Sprintec, 28, 0.25-35 mg-mcg tab TAKE 1 TABLET BY MOUTH ONCE DAILY     No current facility-administered medications for this visit. Allergies: (reviewed)  No Known Allergies     Gyn History:   Last pap: ASC-H  2017 being followed up by Dr. Adolfo Dunbar per patient  History of abnormal pap: yes, h/o JONH-3 s/p prior LEEP  Menses: regular  Contraception: OCPs     OBJECTIVE:    Physical Exam:  VITAL SIGNS: Vitals:    10/06/21 0810   BP: (!) 145/104   Pulse: 75   Weight: 151 lb (68.5 kg)   Height: 5' 9\" (1.753 m)     Body mass index is 22.3 kg/m². GENERAL NIRAJ: Conversant, alert, oriented. No acute distress. HEENT: HEENT. No thyroid enlargement. No JVD. Neck: Supple without restrictions. RESPIRATORY: Clear to auscultation and percussion to the bases. No CVAT. CARDIOVASC: RRR without murmur/rub.    GASTROINT: soft, non-tender, without masses or organomegaly   MUSCULOSKEL: no joint tenderness, deformity or swelling       EXTREMITIES: extremities normal, atraumatic, no cyanosis or edema   PELVIC: Exam chaperoned by nurse. Normal appearing external genitalia. Bartholin gland incision site continues to heal well. Deferred vaginal exam.    RECTAL: deferred   DEVI SURVEY: No suspicious lymphadenopathy or edema noted. NEURO: Grossly intact. No acute deficit. Lab Date as available:    Lab Results   Component Value Date/Time    WBC 7.8 09/02/2021 03:22 PM    HGB 11.8 09/02/2021 03:22 PM    HCT 39.2 09/02/2021 03:22 PM    PLATELET 682 30/99/6163 03:22 PM    MCV 76.1 (L) 09/02/2021 03:22 PM     Lab Results   Component Value Date/Time    Sodium 140 09/02/2021 03:22 PM    Potassium 4.1 09/02/2021 03:22 PM    Chloride 107 09/02/2021 03:22 PM    CO2 29 09/02/2021 03:22 PM    Anion gap 4 (L) 09/02/2021 03:22 PM    Glucose 96 09/02/2021 03:22 PM    BUN 8 09/02/2021 03:22 PM    Creatinine 0.59 09/02/2021 03:22 PM    BUN/Creatinine ratio 14 09/02/2021 03:22 PM    GFR est AA >60 09/02/2021 03:22 PM    GFR est non-AA >60 09/02/2021 03:22 PM    Calcium 8.7 09/02/2021 03:22 PM         IMPRESSION/PLAN:    Ms. Katerin Kinney is a 45 y.o. female with a working diagnosis of possible uterine mass. Presents back today for MRI follow-up. MRI pelvis 6/1/2021 demonstrates normal appearing ovaries and normal appearing multiple uterine fibroids, largest measuring 2.6cm. Bartholin's cyst also imaged. On 9/9/2021 underwent Right Bartholin's gland excision. Final pathology consistent with Bartholin's gland cyst.     Problems:     Patient Active Problem List    Diagnosis Date Noted    Bartholin's cyst 06/16/2021    Uterine mass 06/02/2021     Reviewed patient's course to date, including her benign surgical pathology. She is healing well from surgery. Given her benign pathology, she may be discharged from Gynecologic Oncology clinic. Regarding her fibroids and history of ASC-H pap smear from 2017, she will continue to follow-up with Dr. Latoya Bridges (per her report) and [facility]. She may return to our office as needed. Today's visit performed with an . All questions and concerns were addressed with the patient and she is comfortable with the plan. An electronic signature was used to authenticate this note.      Tamia Sanford MD

## 2021-10-13 ENCOUNTER — TRANSCRIBE ORDER (OUTPATIENT)
Dept: SCHEDULING | Age: 41
End: 2021-10-13

## 2021-10-13 DIAGNOSIS — Z12.31 SCREENING MAMMOGRAM FOR HIGH-RISK PATIENT: Primary | ICD-10-CM

## 2021-10-21 PROCEDURE — 85025 COMPLETE CBC W/AUTO DIFF WBC: CPT

## 2021-10-21 PROCEDURE — 84443 ASSAY THYROID STIM HORMONE: CPT

## 2021-10-21 PROCEDURE — 80053 COMPREHEN METABOLIC PANEL: CPT

## 2021-10-21 PROCEDURE — 84439 ASSAY OF FREE THYROXINE: CPT

## 2021-10-22 ENCOUNTER — HOSPITAL ENCOUNTER (OUTPATIENT)
Dept: LAB | Age: 41
Discharge: HOME | End: 2021-10-22

## 2021-10-22 LAB
ALBUMIN SERPL-MCNC: 3.6 G/DL (ref 3.5–5)
ALBUMIN/GLOB SERPL: 0.9 {RATIO} (ref 1.1–2.2)
ALP SERPL-CCNC: 129 U/L (ref 45–117)
ALT SERPL-CCNC: 20 U/L (ref 12–78)
ANION GAP SERPL CALC-SCNC: 5 MMOL/L (ref 5–15)
AST SERPL-CCNC: 15 U/L (ref 15–37)
BASOPHILS # BLD: 0.1 K/UL (ref 0–0.1)
BASOPHILS NFR BLD: 1 % (ref 0–1)
BILIRUB SERPL-MCNC: 0.2 MG/DL (ref 0.2–1)
BUN SERPL-MCNC: 9 MG/DL (ref 6–20)
BUN/CREAT SERPL: 17 (ref 12–20)
CALCIUM SERPL-MCNC: 9.3 MG/DL (ref 8.5–10.1)
CHLORIDE SERPL-SCNC: 105 MMOL/L (ref 97–108)
CO2 SERPL-SCNC: 25 MMOL/L (ref 21–32)
CREAT SERPL-MCNC: 0.53 MG/DL (ref 0.55–1.02)
DIFFERENTIAL METHOD BLD: ABNORMAL
EOSINOPHIL # BLD: 0.1 K/UL (ref 0–0.4)
EOSINOPHIL NFR BLD: 2 % (ref 0–7)
ERYTHROCYTE [DISTWIDTH] IN BLOOD BY AUTOMATED COUNT: 15.9 % (ref 11.5–14.5)
GLOBULIN SER CALC-MCNC: 4 G/DL (ref 2–4)
GLUCOSE SERPL-MCNC: 106 MG/DL (ref 65–100)
HCT VFR BLD AUTO: 35.7 % (ref 35–47)
HGB BLD-MCNC: 10.7 G/DL (ref 11.5–16)
IMM GRANULOCYTES # BLD AUTO: 0 K/UL (ref 0–0.04)
IMM GRANULOCYTES NFR BLD AUTO: 0 % (ref 0–0.5)
LYMPHOCYTES # BLD: 1.6 K/UL (ref 0.8–3.5)
LYMPHOCYTES NFR BLD: 23 % (ref 12–49)
MCH RBC QN AUTO: 23.2 PG (ref 26–34)
MCHC RBC AUTO-ENTMCNC: 30 G/DL (ref 30–36.5)
MCV RBC AUTO: 77.3 FL (ref 80–99)
MONOCYTES # BLD: 0.5 K/UL (ref 0–1)
MONOCYTES NFR BLD: 7 % (ref 5–13)
NEUTS SEG # BLD: 4.8 K/UL (ref 1.8–8)
NEUTS SEG NFR BLD: 67 % (ref 32–75)
NRBC # BLD: 0 K/UL (ref 0–0.01)
NRBC BLD-RTO: 0 PER 100 WBC
PLATELET # BLD AUTO: 289 K/UL (ref 150–400)
PMV BLD AUTO: 12.8 FL (ref 8.9–12.9)
POTASSIUM SERPL-SCNC: 3.7 MMOL/L (ref 3.5–5.1)
PROT SERPL-MCNC: 7.6 G/DL (ref 6.4–8.2)
RBC # BLD AUTO: 4.62 M/UL (ref 3.8–5.2)
SODIUM SERPL-SCNC: 135 MMOL/L (ref 136–145)
T4 FREE SERPL-MCNC: 1.1 NG/DL (ref 0.8–1.5)
TSH SERPL DL<=0.05 MIU/L-ACNC: 1.65 UIU/ML (ref 0.36–3.74)
WBC # BLD AUTO: 7.1 K/UL (ref 3.6–11)

## 2021-12-08 ENCOUNTER — HOSPITAL ENCOUNTER (OUTPATIENT)
Dept: MAMMOGRAPHY | Age: 41
Discharge: HOME | End: 2021-12-08
Payer: SUBSIDIZED

## 2021-12-08 DIAGNOSIS — Z12.31 SCREENING MAMMOGRAM FOR HIGH-RISK PATIENT: ICD-10-CM

## 2021-12-08 PROCEDURE — 77067 SCR MAMMO BI INCL CAD: CPT

## 2022-03-18 PROBLEM — N85.8 UTERINE MASS: Status: ACTIVE | Noted: 2021-06-02

## 2022-03-18 PROBLEM — N75.0 BARTHOLIN'S CYST: Status: ACTIVE | Noted: 2021-06-16

## 2022-10-14 ENCOUNTER — TRANSCRIBE ORDER (OUTPATIENT)
Dept: SCHEDULING | Age: 42
End: 2022-10-14

## 2022-10-14 DIAGNOSIS — N92.6 IRREGULAR MENSTRUAL CYCLE: Primary | ICD-10-CM

## 2022-10-19 ENCOUNTER — HOSPITAL ENCOUNTER (OUTPATIENT)
Dept: ULTRASOUND IMAGING | Age: 42
Discharge: HOME | End: 2022-10-19

## 2022-10-19 DIAGNOSIS — N92.6 IRREGULAR MENSTRUAL CYCLE: ICD-10-CM

## 2022-10-19 PROCEDURE — 76856 US EXAM PELVIC COMPLETE: CPT

## 2022-10-19 PROCEDURE — 76830 TRANSVAGINAL US NON-OB: CPT

## 2023-05-23 RX ORDER — NORGESTIMATE AND ETHINYL ESTRADIOL 0.25-0.035
1 KIT ORAL DAILY
COMMUNITY
Start: 2021-05-18

## (undated) DEVICE — CURVED, SMALL JAW, OPEN SEALER/DIVIDER: Brand: LIGASURE

## (undated) DEVICE — NEEDLE HYPO 22GA L1.5IN BLK S STL HUB POLYPR SHLD REG BVL

## (undated) DEVICE — SWAB MEDICATED 8 IN PROCTO

## (undated) DEVICE — GAUZE,SPONGE,FLUFF,6"X6.75",STRL,5/TRAY: Brand: MEDLINE

## (undated) DEVICE — SUTURE VCRL SZ 2-0 L27IN ABSRB UD L26MM SH 1/2 CIR J417H

## (undated) DEVICE — GOWN,SIRUS,FABRNF,XL,20/CS: Brand: MEDLINE

## (undated) DEVICE — SYR 20ML LL STRL LF --

## (undated) DEVICE — ROCKER SWITCH PENCIL BLADE ELECTRODE, HOLSTER: Brand: EDGE

## (undated) DEVICE — SUTURE VCRL SZ 2-0 L36IN ABSRB UD L40MM CT 1/2 CIR J957H

## (undated) DEVICE — GYN LITHOTOMY: Brand: MEDLINE INDUSTRIES, INC.

## (undated) DEVICE — COVER LT HNDL PLAS RIG 1 PER PK

## (undated) DEVICE — HEX-LOCKING BLADE ELECTRODE: Brand: EDGE

## (undated) DEVICE — HOOK RETRCT L5MM E SHRP SELF RET SYS LONE STAR

## (undated) DEVICE — BASIN ST MAJOR-NO CAUTERY: Brand: MEDLINE INDUSTRIES, INC.

## (undated) DEVICE — SUTURE MCRYL SZ 4-0 L27IN ABSRB UD L19MM PS-2 1/2 CIR PRIM Y426H

## (undated) DEVICE — SUTURE VCRL SZ 3-0 L27IN ABSRB UD L26MM SH 1/2 CIR J416H

## (undated) DEVICE — RING RETRCTR SQUARE 14.1X14.1C --

## (undated) DEVICE — REM POLYHESIVE ADULT PATIENT RETURN ELECTRODE: Brand: VALLEYLAB